# Patient Record
Sex: FEMALE | Race: OTHER | HISPANIC OR LATINO | Employment: OTHER | URBAN - METROPOLITAN AREA
[De-identification: names, ages, dates, MRNs, and addresses within clinical notes are randomized per-mention and may not be internally consistent; named-entity substitution may affect disease eponyms.]

---

## 2018-07-12 PROBLEM — R73.03 PREDIABETES: Status: ACTIVE | Noted: 2017-11-02

## 2018-07-25 ENCOUNTER — APPOINTMENT (OUTPATIENT)
Dept: LAB | Facility: HOSPITAL | Age: 48
End: 2018-07-25
Payer: COMMERCIAL

## 2018-07-25 ENCOUNTER — OFFICE VISIT (OUTPATIENT)
Dept: FAMILY MEDICINE CLINIC | Facility: CLINIC | Age: 48
End: 2018-07-25
Payer: COMMERCIAL

## 2018-07-25 VITALS
WEIGHT: 185.8 LBS | DIASTOLIC BLOOD PRESSURE: 78 MMHG | HEIGHT: 65 IN | SYSTOLIC BLOOD PRESSURE: 118 MMHG | BODY MASS INDEX: 30.96 KG/M2

## 2018-07-25 DIAGNOSIS — R10.84 GENERALIZED ABDOMINAL PAIN: Primary | ICD-10-CM

## 2018-07-25 DIAGNOSIS — E66.09 CLASS 1 OBESITY DUE TO EXCESS CALORIES WITHOUT SERIOUS COMORBIDITY WITH BODY MASS INDEX (BMI) OF 30.0 TO 30.9 IN ADULT: ICD-10-CM

## 2018-07-25 DIAGNOSIS — N39.3 STRESS INCONTINENCE: ICD-10-CM

## 2018-07-25 DIAGNOSIS — R73.01 IMPAIRED FASTING BLOOD SUGAR: ICD-10-CM

## 2018-07-25 DIAGNOSIS — R10.84 GENERALIZED ABDOMINAL PAIN: ICD-10-CM

## 2018-07-25 DIAGNOSIS — Z12.4 SCREENING FOR CERVICAL CANCER: ICD-10-CM

## 2018-07-25 PROBLEM — R73.03 PREDIABETES: Status: RESOLVED | Noted: 2017-11-02 | Resolved: 2018-07-25

## 2018-07-25 LAB
ALBUMIN SERPL BCP-MCNC: 4 G/DL (ref 3.5–5)
ALP SERPL-CCNC: 74 U/L (ref 46–116)
ALT SERPL W P-5'-P-CCNC: 6 U/L (ref 12–78)
ANION GAP SERPL CALCULATED.3IONS-SCNC: 6 MMOL/L (ref 4–13)
AST SERPL W P-5'-P-CCNC: 28 U/L (ref 5–45)
BILIRUB SERPL-MCNC: 0.73 MG/DL (ref 0.2–1)
BUN SERPL-MCNC: 15 MG/DL (ref 5–25)
CALCIUM SERPL-MCNC: 9.1 MG/DL (ref 8.3–10.1)
CHLORIDE SERPL-SCNC: 104 MMOL/L (ref 100–108)
CHOLEST SERPL-MCNC: 162 MG/DL (ref 50–200)
CO2 SERPL-SCNC: 28 MMOL/L (ref 21–32)
CREAT SERPL-MCNC: 0.5 MG/DL (ref 0.6–1.3)
EST. AVERAGE GLUCOSE BLD GHB EST-MCNC: 114 MG/DL
GFR SERPL CREATININE-BSD FRML MDRD: 116 ML/MIN/1.73SQ M
GLUCOSE P FAST SERPL-MCNC: 99 MG/DL (ref 65–99)
HBA1C MFR BLD: 5.6 % (ref 4.2–6.3)
HDLC SERPL-MCNC: 58 MG/DL (ref 40–60)
LDLC SERPL CALC-MCNC: 91 MG/DL (ref 0–100)
NONHDLC SERPL-MCNC: 104 MG/DL
POTASSIUM SERPL-SCNC: 4.3 MMOL/L (ref 3.5–5.3)
PROT SERPL-MCNC: 7.8 G/DL (ref 6.4–8.2)
SODIUM SERPL-SCNC: 138 MMOL/L (ref 136–145)
TRIGL SERPL-MCNC: 67 MG/DL
TSH SERPL DL<=0.05 MIU/L-ACNC: 2.91 UIU/ML (ref 0.36–3.74)

## 2018-07-25 PROCEDURE — 3008F BODY MASS INDEX DOCD: CPT | Performed by: FAMILY MEDICINE

## 2018-07-25 PROCEDURE — 80061 LIPID PANEL: CPT

## 2018-07-25 PROCEDURE — 80053 COMPREHEN METABOLIC PANEL: CPT

## 2018-07-25 PROCEDURE — 84443 ASSAY THYROID STIM HORMONE: CPT

## 2018-07-25 PROCEDURE — 83036 HEMOGLOBIN GLYCOSYLATED A1C: CPT

## 2018-07-25 PROCEDURE — 36415 COLL VENOUS BLD VENIPUNCTURE: CPT

## 2018-07-25 PROCEDURE — 99204 OFFICE O/P NEW MOD 45 MIN: CPT | Performed by: FAMILY MEDICINE

## 2018-07-25 NOTE — PROGRESS NOTES
Assessment/Plan:    Impaired fasting blood sugar  Check sugar and also Hb A1c    Class 1 obesity due to excess calories without serious comorbidity with body mass index (BMI) of 30 0 to 30 9 in adult  Discussed diet and exercise    Generalized abdominal pain  Refer to general surgery Rule out ventral hernia     Stress incontinence  Continue kegel exercises Patient needs to see GYN       Diagnoses and all orders for this visit:    Generalized abdominal pain  -     Comprehensive metabolic panel; Future  -     Ambulatory referral to General Surgery; Future    Impaired fasting blood sugar  -     Hemoglobin A1C; Future    Class 1 obesity due to excess calories without serious comorbidity with body mass index (BMI) of 30 0 to 30 9 in adult  -     Lipid panel; Future  -     TSH baseline; Future    Stress incontinence  -     Ambulatory referral to Obstetrics / Gynecology; Future    Screening for cervical cancer  -     Ambulatory referral to Obstetrics / Gynecology; Future          Subjective:  Chief Complaint   Patient presents with    Urine Leakage      x 1 yr   Bloated      x  1yr  Patient ID: Husam Clark is a 56 y o  female      Patient is a new patient to me today Patient presents with 2 major concerns One is urinary leakage with coughing or laughing The second issue is abdominal pain and bloating Patient has had the urinary complaints for over one year She ahs not seen GYN is several years Patient had a tubal She has had 3 vaginal delieveries Patient has normal regular periods Patient notes that her incontinence is going on over one year Patient notes that full stomach makes it worse Patient is doing kegel exercises without any results Patient is not aware of any history of prolapse of her uterus Patient is noticing that she has had bloating and stomach pain for last one year Patient notes an area of the mid abdomen that protrudes with certain positions and seems to be "getting bigger" Patient notes she eats a diet of lots of fruits and vegetables and very littly red meat Patient did quit smoking 2 yrs ago and did gain weight with that Since her weight is up her stomach appears much bigger than it was in the past She has gained about 25 pounds per her old records       Abdominal Pain   This is a chronic problem  The current episode started more than 1 year ago  The onset quality is gradual  The problem occurs intermittently  The problem has been unchanged  The pain is located in the suprapubic region and periumbilical region  The pain is at a severity of 4/10  The quality of the pain is aching and cramping  The abdominal pain does not radiate  Associated symptoms include belching and flatus  Pertinent negatives include no anorexia, arthralgias, constipation, diarrhea, dysuria, fever, frequency, headaches, hematochezia, hematuria, melena, myalgias, nausea, vomiting or weight loss  Nothing aggravates the pain  She has tried nothing for the symptoms  There is no history of abdominal surgery, colon cancer, Crohn's disease, gallstones, GERD, irritable bowel syndrome, pancreatitis, PUD or ulcerative colitis  The following portions of the patient's history were reviewed and updated as appropriate: allergies, current medications, past family history, past medical history, past social history, past surgical history and problem list     Review of Systems   Constitutional: Negative for fatigue, fever, unexpected weight change and weight loss  HENT: Negative for congestion, sinus pain and trouble swallowing  Eyes: Negative for discharge and visual disturbance  Respiratory: Negative for cough, chest tightness, shortness of breath and wheezing  Cardiovascular: Negative for chest pain, palpitations and leg swelling  Gastrointestinal: Positive for abdominal distention, abdominal pain and flatus   Negative for anal bleeding, anorexia, blood in stool, constipation, diarrhea, hematochezia, melena, nausea, rectal pain and vomiting  Genitourinary: Negative for difficulty urinating, dysuria, frequency and hematuria  Incontinence   Musculoskeletal: Negative for arthralgias, gait problem, joint swelling and myalgias  Skin: Negative for rash and wound  Allergic/Immunologic: Negative for environmental allergies and food allergies  Neurological: Negative for dizziness, syncope, weakness, numbness and headaches  Hematological: Negative for adenopathy  Does not bruise/bleed easily  Psychiatric/Behavioral: Negative for confusion, decreased concentration and sleep disturbance  The patient is not nervous/anxious  Objective:      /78   Ht 5' 5" (1 651 m)   Wt 84 3 kg (185 lb 12 8 oz)   BMI 30 92 kg/m²          Physical Exam   Constitutional: She is oriented to person, place, and time  She appears well-developed and well-nourished  HENT:   Head: Normocephalic and atraumatic  Right Ear: External ear normal    Left Ear: External ear normal    Nose: Nose normal    Mouth/Throat: Oropharynx is clear and moist    Eyes: Conjunctivae and EOM are normal  Pupils are equal, round, and reactive to light  Neck: Normal range of motion  Neck supple  Cardiovascular: Normal rate, regular rhythm and normal heart sounds  Pulmonary/Chest: Effort normal and breath sounds normal    Abdominal:   Abdomen soft Patient has tendeness periumbilical and also suprapubic With strain there is protrusion mid line ?vental hernia vs diasthsis rectus normal bowel sounds No guarding no rebound   Lymphadenopathy:     She has no cervical adenopathy  Neurological: She is alert and oriented to person, place, and time  Skin: No rash noted  Psychiatric: She has a normal mood and affect   Her behavior is normal  Judgment and thought content normal

## 2018-07-25 NOTE — PATIENT INSTRUCTIONS
Urinary Incontinence   WHAT YOU NEED TO KNOW:   What is urinary incontinence? Urinary incontinence (UI) is when you lose control of your bladder  What causes UI? UI occurs because your bladder cannot store or empty urine properly  The following are the most common types of UI:  · Stress incontinence  is when you leak urine due to increased bladder pressure  This may happen when you cough, sneeze, or exercise  · Urge incontinence  is when you feel the need to urinate right away and leak urine accidentally  · Mixed incontinence  is when you have both stress and urge UI  What are the signs and symptoms of UI?   · You feel like your bladder does not empty completely when you urinate  · You urinate often and need to urinate immediately  · You leak urine when you sleep, or you wake up with the urge to urinate  · You leak urine when you cough, sneeze, exercise, or laugh  How is UI diagnosed? Your healthcare provider will ask how often you leak urine and whether you have stress or urge symptoms  Tell him which medicines you take, how often you urinate, and how much liquid you drink each day  You may need any of the following tests:  · Urine tests  may show infection or kidney function  · A pelvic exam  may be done to check for blockages  A pelvic exam will also show if your bladder, uterus, or other organs have moved out of place  · An x-ray, ultrasound, or CT  may show problems with parts of your urinary system  You may be given contrast liquid to help your organs show up better in the pictures  Tell the healthcare provider if you have ever had an allergic reaction to contrast liquid  Do not enter the MRI room with anything metal  Metal can cause serious injury  Tell the healthcare provider if you have any metal in or on your body  · A bladder scan  will show how much urine is left in your bladder after you urinate   You will be asked to urinate and then healthcare providers will use a small ultrasound machine to check the urine left in your bladder  · Cystometry  is used to check the function of your urinary system  Your healthcare provider checks the pressure in your bladder while filling it with fluid  Your bladder pressure may also be tested when your bladder is full and while you urinate  How is UI treated? · Medicines  can help strengthen your bladder control  · Electrical stimulation  is used to send a small amount of electrical energy to your pelvic floor muscles  This helps control your bladder function  Electrodes may be placed outside your body or in your rectum  For women, the electrodes may be placed in the vagina  · A bulking agent  may be injected into the wall of your urethra to make it thicker  This helps keep your urethra closed and decreases urine leakage  · Devices  such as a clamp, pessary, or tampon may help stop urine leaks  Ask your healthcare provider for more information about these and other devices  · Surgery  may be needed if other treatments do not work  Several types of surgery can help improve your bladder control  Ask your healthcare provider for more information about the surgery you may need  How can I manage my symptoms? · Do pelvic muscle exercises often  Your pelvic muscles help you stop urinating  Squeeze these muscles tight for 5 seconds, then relax for 5 seconds  Gradually work up to squeezing for 10 seconds  Do 3 sets of 15 repetitions a day, or as directed  This will help strengthen your pelvic muscles and improve bladder control  · A catheter  may be used to help empty your bladder  A catheter is a tiny, plastic tube that is put into your bladder to drain your urine  Your healthcare provider may tell you to use a catheter to prevent your bladder from getting too full and leaking urine  · Keep a UI record  Write down how often you leak urine and how much you leak   Make a note of what you were doing when you leaked urine     · Train your bladder  Go to the bathroom at set times, such as every 2 hours, even if you do not feel the urge to go  You can also try to hold your urine when you feel the urge to go  For example, hold your urine for 5 minutes when you feel the urge to go  As that becomes easier, hold your urine for 10 minutes  · Drink liquids as directed  Ask your healthcare provider how much liquid to drink each day and which liquids are best for you  You may need to limit the amount of liquid you drink to help control your urine leakage  Limit or do not have drinks that contain caffeine or alcohol  Do not drink any liquid right before you go to bed  · Prevent constipation  Eat a variety of high-fiber foods  Good examples are high-fiber cereals, beans, vegetables, and whole-grain breads  Prune juice may help make your bowel movement softer  Walking is the best way to trigger your intestines to have a bowel movement  · Exercise regularly and maintain a healthy weight  Ask your healthcare provider how much you should weigh and about the best exercise plan for you  Weight loss and exercise will decrease pressure on your bladder and help you control your leakage  Ask him to help you create a weight loss plan if you are overweight  When should I seek immediate care? · You have severe pain  · You are confused or cannot think clearly  When should I contact my healthcare provider? · You have a fever  · You see blood in your urine  · You have pain when you urinate  · You have new or worse pain, even after treatment  · Your mouth feels dry or you have vision changes  · Your urine is cloudy or smells bad  · You have questions or concerns about your condition or care  CARE AGREEMENT:   You have the right to help plan your care  Learn about your health condition and how it may be treated  Discuss treatment options with your caregivers to decide what care you want to receive   You always have the right to refuse treatment  The above information is an  only  It is not intended as medical advice for individual conditions or treatments  Talk to your doctor, nurse or pharmacist before following any medical regimen to see if it is safe and effective for you  © 2017 [employer] Information is for End User's use only and may not be sold, redistributed or otherwise used for commercial purposes  All illustrations and images included in CareNotes® are the copyrighted property of [employer]  or [employer]  Basic Carbohydrate Counting   AMBULATORY CARE:   Carbohydrate counting  is a way to plan your meals by counting the amount of carbohydrate in foods  Carbohydrates are the sugars, starches, and fiber found in fruit, grains, vegetables, and milk products  Carbohydrates increase your blood sugar levels  Carbohydrate counting can help you eat the right amount of carbohydrate to keep your blood sugar levels under control  What you need to know about planning meals using carbohydrate counting:  · A dietitian or healthcare provider will help you develop a healthy meal plan that works best for you  You will be taught how much carbohydrate to eat or drink for each meal and snack  Your meal plan will be based on your age, weight, usual food intake, and physical activity level  If you have diabetes, it will also include your blood sugar levels and diabetes medicine  Once you know how much carbohydrate you should eat, you can decide what type of food you want to eat  · You will need to know what foods contain carbohydrate and how much they contain  Keep track of the amount of carbohydrate in meals and snacks in order to follow your meal plan  Do not avoid carbohydrates or skip meals  Your blood sugar may fall too low if you do not eat enough carbohydrate or you skip meals    Foods that contain carbohydrate:   · Breads:  Each serving of food listed below contains about 15 g of carbohydrate   ¨ 1 slice of bread (1 ounce) or 1 flour or corn tortilla (6 inch)    ¨ ½ of a hamburger bun or ¼ of a large bagel (about 1 ounce)    ¨ 1 pancake (about 4 inches across and ¼ inch thick)    · Cereals and grains:  Serving sizes of ready-to-eat cereals vary  Look at the serving size and the total carbohydrate amount listed on the food label  Each serving of food listed below contains about 15 g of carbohydrate   ¨ ¾ cup of dry, unsweetened, ready-to-eat cereal or ¼ cup of low-fat granola     ¨ ½ cup of oatmeal or other cooked cereal     ¨ ? cup of cooked rice or pasta    · Starchy vegetables and beans:  Each serving of food listed below contains about 15 g of carbohydrate   ¨ ½ cup of corn, green peas, sweet potatoes, or mashed potatoes    ¨ ¼ of a large baked potato    ¨ ½ cup of beans, lentils, and peas (garbanzo, polo, kidney, white, split, black-eyed)    · Crackers and snacks:  Each serving of food listed below contains about 15 g of carbohydrate   ¨ 3 arpit cracker squares or 8 animal crackers     ¨ 6 saltine-type crackers    ¨ 3 cups of popcorn or ¾ ounce of pretzels, potato chips, or tortilla chips    · Fruit:  Each serving of food listed below contains about 15 g of carbohydrate   ¨ 1 small (4 ounce) piece of fresh fruit or ¾ to 1 cup of fresh fruit    ¨ ½ cup of canned or frozen fruit, packed in natural juice    ¨ ½ cup (4 ounces) of unsweetened fruit juice    ¨ 2 tablespoons of dried fruit    · Desserts or sugary foods:  Each serving of food listed below contains about 15 g of carbohydrate   ¨ 2-inch square unfrosted cake or brownie     ¨ 2 small cookies    ¨ ½ cup of ice cream, frozen yogurt, or nondairy frozen yogurt    ¨ ¼ cup of sherbet or sorbet    ¨ 1 tablespoon of regular syrup, jam, or jelly    ¨ 2 tablespoons of light syrup    · Milk and yogurt:  Foods from the milk group contain about 12 g of carbohydrate per serving      ¨ 1 cup of fat-free or low-fat milk    ¨ 1 cup of soy milk    ¨ ? cup of fat-free, yogurt sweetened with artificial sweetener    · Non-starchy vegetables:  Each serving contains about 5 g of carbohydrate   Three servings of non-starch vegetables count as 1 carbohydrate serving  ¨ ½ cup of cooked vegetables or 1 cup of raw vegetables  This includes beets, broccoli, cabbage, cauliflower, cucumber, mushrooms, tomatoes, and zucchini    ¨ ½ cup of vegetable juice  How to use carbohydrate counting to plan meals:   · Count carbohydrate amounts using serving sizes:      ¨ Pasta dinner example: You plan to have pasta, tossed salad, and an 8-ounce glass of milk  Your healthcare provider tells you that you may have 4 carbohydrate servings for dinner  One carbohydrate serving of pasta is ? cup  One cup of pasta will equal 3 carbohydrate servings  An 8-ounce glass of milk will count as 1 carbohydrate serving  These amounts of food would equal 4 carbohydrate servings  One cup of tossed salad does not count toward your carbohydrate servings  · Count carbohydrate amounts using food labels:  Find the total amount of carbohydrate in a packaged food by reading the food label  Food labels tell you the serving size of the food and the total carbohydrate amount in each serving  Find the serving size on the food label and then decide how many servings you will eat  Multiply the number of servings you plan to eat by the carbohydrate amount per serving  ¨ Granola bar snack example: Your meal plan allows you to have 2 carbohydrate servings (30 grams) of carbohydrate for a snack  You plan to eat 1 package of granola bars, which contains 2 bars  According to the food label, the serving size of food in this package is 1 bar  Each serving (1 bar) contains 25 grams of carbohydrate  The total amount of carbohydrate in this package of granola bars would be 50 g  Based on your meal plan, you should eat only 1 bar      Follow up with your healthcare provider as directed:  Write down your questions so you remember to ask them during your visits  © 2017 [employer] Information is for End User's use only and may not be sold, redistributed or otherwise used for commercial purposes  All illustrations and images included in CareNotes® are the copyrighted property of [employer]  or [employer]  The above information is an  only  It is not intended as medical advice for individual conditions or treatments  Talk to your doctor, nurse or pharmacist before following any medical regimen to see if it is safe and effective for you

## 2018-08-07 ENCOUNTER — OFFICE VISIT (OUTPATIENT)
Dept: SURGERY | Facility: CLINIC | Age: 48
End: 2018-08-07
Payer: COMMERCIAL

## 2018-08-07 DIAGNOSIS — R10.84 GENERALIZED ABDOMINAL PAIN: ICD-10-CM

## 2018-08-07 DIAGNOSIS — K43.9 VENTRAL HERNIA WITHOUT OBSTRUCTION OR GANGRENE: Primary | ICD-10-CM

## 2018-08-07 DIAGNOSIS — M62.08 RECTUS DIASTASIS: ICD-10-CM

## 2018-08-07 PROCEDURE — 99243 OFF/OP CNSLTJ NEW/EST LOW 30: CPT | Performed by: PHYSICIAN ASSISTANT

## 2018-08-07 NOTE — PROGRESS NOTES
Assessment/Plan:   Blanca Vásquez is a 56 y  o female who is here for The primary encounter diagnosis was Ventral hernia without obstruction or gangrene  Diagnoses of Generalized abdominal pain and Rectus diastasis were also pertinent to this visit  Patient with abdominal bulge and pain , patient concerned about hernia  Plan: Appearance more of a rectus diastasis , unable to palpate a hernia  Will  Get Ct scan to rule out defect  If no hernia noted referral to plastic surgery  Imaging:pending      _____________________________________________      HPI:  Blanca Vásquez is a 56 y  o female who was referred for evaluation of abdominal pain and bulge  Currently complaining of  Concern for Ventral Hernia and abdominal pain  worse with lifting, standing, walking,     no nausea and no vomiting,     regular bowel movement  Patient with periumbilical bulge and weakness since pregnancy   Patient had laparoscopic tubal ligation    Duration of pain or symptoms:  Intermittent and over 1 year      Prior abdominal surgery:   Yes      No results found for: WBC, HGB, HCT, MCV, PLT  Lab Results   Component Value Date     07/25/2018    K 4 3 07/25/2018     07/25/2018    CO2 28 07/25/2018    ANIONGAP 6 07/25/2018    BUN 15 07/25/2018    CREATININE 0 50 (L) 07/25/2018    GLUF 99 07/25/2018    CALCIUM 9 1 07/25/2018    AST 28 07/25/2018    ALT 6 (L) 07/25/2018    ALKPHOS 74 07/25/2018    PROT 7 8 07/25/2018    BILITOT 0 73 07/25/2018    EGFR 116 07/25/2018     No results found for: INR, PROTIME        ROS:  General ROS: negative  negative for - chills, fatigue, fever or night sweats, weight loss  Respiratory ROS: no cough, shortness of breath, or wheezing  Cardiovascular ROS: no chest pain or dyspnea on exertion  Genito-Urinary ROS: no dysuria, trouble voiding, or hematuria  Musculoskeletal ROS: negative for - gait disturbance, joint pain or muscle pain  Neurological ROS: no TIA or stroke symptoms  Abdominal ROS: see HPI  GI ROS: see HPI  Skin ROS: no new rashes or lesions   Lymphatic ROS: no new adenopathy noted by pt  GYN ROS: see HPI, no new GYN history or bleeding noted  Psy ROS: no new mental or behavioral disturbances       Patient Active Problem List   Diagnosis    Class 1 obesity due to excess calories without serious comorbidity with body mass index (BMI) of 30 0 to 30 9 in adult    Dense breasts    Impaired fasting blood sugar    Stress incontinence    Vitamin D deficiency    Generalized abdominal pain         Allergies: Sulfa antibiotics    Meds:No current outpatient prescriptions on file  PMH:  Past Medical History:   Diagnosis Date    Arthritis     Obesity        PSH:  Past Surgical History:   Procedure Laterality Date    TUBAL LIGATION         Family History   Problem Relation Age of Onset    No Known Problems Mother     No Known Problems Father         reports that she has quit smoking  She has never used smokeless tobacco  She reports that she does not drink alcohol or use drugs  PHYSICAL EXAM  General Appearance:    Alert, cooperative, no distress   Head:    Normocephalic without obvious abnormality   Eyes:    PERRL, conjunctiva/corneas clear, EOM's intact        Neck:   Supple, no adenopathy, no JVD   Back:     Symmetric, no spinal or CVA tenderness   Lungs:     Clear to auscultation bilaterally, no wheezing or rhonchi   Heart:    Regular rate and rhythm, S1 and S2 normal, no murmur   Abdomen:      abdomen is soft without significant tenderness, masses, organomegaly or guarding Bowel sounds are normal     tender and possible supraumbilical hernia but fascial edges not able to be palpated  Rectus diastasis  The fascial edges are not palpable due to  Central obesity  Extremities:   Extremities normal  No clubbing, cyanosis or edema   Psych:   Normal Affect, AOx3  Neurologic:  Skin:   CNII-XII intact   Strength symmetric, speech intact    Warm, dry, intact, no visible rashes or lesions                   Informed consent for procedure was personally discussed, reviewed, and signed by Dr Husam Stanton  Discussion by Dr Husam Stanton was carried out regarding risks, benefits, and alternatives with the patient  Risks include but are not limited to:  bleeding, infection, and delayed wound healing or an open wound, pulmonary embolus, leaks from bowel or bile ducts or other viscus, transfusions, death  Discussed in further detail the more common complications and their rates of occurrence   was used if necessary  Patient expressed understanding of the issues discussed and wished/consented to proceed  All questions were answered by Dr Husam Stanton  Discussion performed between patient and the provider signing below  Signature:   Abdirahmanryland Mavis    Date: 8/7/2018 Time: 12:16 PM       Some portions of this record may have been generated with voice recognition software  There may be translation, syntax,  or grammatical errors  Occasional wrong word or "sound-a-like" substitutions may have occurred due to the inherent limitations of the voice recognition software  Read the chart carefully and recognize, using context, where substitutions may have occurred  If you have any questions, please contact the dictating provider for clarification or correction, as needed  This encounter has been coded by a non-certified coder

## 2018-08-07 NOTE — LETTER
August 7, 2018     Belkis Curry DO  [address]    Patient: Di Oconnor   YOB: 1970   Date of Visit: 8/7/2018       Dear Dr Cal Green:    Thank you for referring Di Oconnor to me for evaluation  Below are my notes for this consultation  If you have questions, please do not hesitate to call me  I look forward to following your patient along with you  Sincerely,        Ayush Rodriguez PA-C        CC: No Recipients  Verner Saras  8/7/2018 12:26 PM  Sign at close encounter  Assessment/Plan:   Di Oconnor is a 56 y  o female who is here for The primary encounter diagnosis was Ventral hernia without obstruction or gangrene  Diagnoses of Generalized abdominal pain and Rectus diastasis were also pertinent to this visit  Patient with abdominal bulge and pain , patient concerned about hernia  Plan: Appearance more of a rectus diastasis , unable to palpate a hernia  Will  Get Ct scan to rule out defect  If no hernia noted referral to plastic surgery  Imaging:pending      _____________________________________________      HPI:  Di Oconnor is a 56 y  o female who was referred for evaluation of abdominal pain and bulge  Currently complaining of  Concern for Ventral Hernia and abdominal pain  worse with lifting, standing, walking,     no nausea and no vomiting,     regular bowel movement  Patient with periumbilical bulge and weakness since pregnancy   Patient had laparoscopic tubal ligation    Duration of pain or symptoms:  Intermittent and over 1 year      Prior abdominal surgery:   Yes      No results found for: WBC, HGB, HCT, MCV, PLT  Lab Results   Component Value Date     07/25/2018    K 4 3 07/25/2018     07/25/2018    CO2 28 07/25/2018    ANIONGAP 6 07/25/2018    BUN 15 07/25/2018    CREATININE 0 50 (L) 07/25/2018    GLUF 99 07/25/2018    CALCIUM 9 1 07/25/2018    AST 28 07/25/2018    ALT 6 (L) 07/25/2018    ALKPHOS 74 07/25/2018    PROT 7 8 07/25/2018    BILITOT 0 73 07/25/2018    EGFR 116 07/25/2018     No results found for: INR, PROTIME        ROS:  General ROS: negative  negative for - chills, fatigue, fever or night sweats, weight loss  Respiratory ROS: no cough, shortness of breath, or wheezing  Cardiovascular ROS: no chest pain or dyspnea on exertion  Genito-Urinary ROS: no dysuria, trouble voiding, or hematuria  Musculoskeletal ROS: negative for - gait disturbance, joint pain or muscle pain  Neurological ROS: no TIA or stroke symptoms  Abdominal ROS: see HPI  GI ROS: see HPI  Skin ROS: no new rashes or lesions   Lymphatic ROS: no new adenopathy noted by pt  GYN ROS: see HPI, no new GYN history or bleeding noted  Psy ROS: no new mental or behavioral disturbances       Patient Active Problem List   Diagnosis    Class 1 obesity due to excess calories without serious comorbidity with body mass index (BMI) of 30 0 to 30 9 in adult    Dense breasts    Impaired fasting blood sugar    Stress incontinence    Vitamin D deficiency    Generalized abdominal pain         Allergies: Sulfa antibiotics    Meds:No current outpatient prescriptions on file  PMH:  Past Medical History:   Diagnosis Date    Arthritis     Obesity        PSH:  Past Surgical History:   Procedure Laterality Date    TUBAL LIGATION         Family History   Problem Relation Age of Onset    No Known Problems Mother     No Known Problems Father         reports that she has quit smoking  She has never used smokeless tobacco  She reports that she does not drink alcohol or use drugs        PHYSICAL EXAM  General Appearance:    Alert, cooperative, no distress   Head:    Normocephalic without obvious abnormality   Eyes:    PERRL, conjunctiva/corneas clear, EOM's intact        Neck:   Supple, no adenopathy, no JVD   Back:     Symmetric, no spinal or CVA tenderness   Lungs:     Clear to auscultation bilaterally, no wheezing or rhonchi Heart:    Regular rate and rhythm, S1 and S2 normal, no murmur   Abdomen:      abdomen is soft without significant tenderness, masses, organomegaly or guarding Bowel sounds are normal     tender and possible supraumbilical hernia but fascial edges not able to be palpated  Rectus diastasis  The fascial edges are not palpable due to  Central obesity  Extremities:   Extremities normal  No clubbing, cyanosis or edema   Psych:   Normal Affect, AOx3  Neurologic:  Skin:   CNII-XII intact  Strength symmetric, speech intact    Warm, dry, intact, no visible rashes or lesions                   Informed consent for procedure was personally discussed, reviewed, and signed by Dr Debora Milton  Discussion by Dr Debora Milton was carried out regarding risks, benefits, and alternatives with the patient  Risks include but are not limited to:  bleeding, infection, and delayed wound healing or an open wound, pulmonary embolus, leaks from bowel or bile ducts or other viscus, transfusions, death  Discussed in further detail the more common complications and their rates of occurrence   was used if necessary  Patient expressed understanding of the issues discussed and wished/consented to proceed  All questions were answered by Dr Debora Milton  Discussion performed between patient and the provider signing below  Signature:   Evan Pagan    Date: 8/7/2018 Time: 12:16 PM       Some portions of this record may have been generated with voice recognition software  There may be translation, syntax,  or grammatical errors  Occasional wrong word or "sound-a-like" substitutions may have occurred due to the inherent limitations of the voice recognition software  Read the chart carefully and recognize, using context, where substitutions may have occurred  If you have any questions, please contact the dictating provider for clarification or correction, as needed  This encounter has been coded by a non-certified coder

## 2018-08-08 ENCOUNTER — OFFICE VISIT (OUTPATIENT)
Dept: OBGYN CLINIC | Facility: MEDICAL CENTER | Age: 48
End: 2018-08-08
Payer: COMMERCIAL

## 2018-08-08 VITALS — BODY MASS INDEX: 30.7 KG/M2 | DIASTOLIC BLOOD PRESSURE: 82 MMHG | WEIGHT: 184.5 LBS | SYSTOLIC BLOOD PRESSURE: 110 MMHG

## 2018-08-08 DIAGNOSIS — N85.2 ENLARGED UTERUS: Primary | ICD-10-CM

## 2018-08-08 DIAGNOSIS — R32 URINARY INCONTINENCE, UNSPECIFIED TYPE: ICD-10-CM

## 2018-08-08 PROCEDURE — 99203 OFFICE O/P NEW LOW 30 MIN: CPT | Performed by: OBSTETRICS & GYNECOLOGY

## 2018-08-08 PROCEDURE — 87086 URINE CULTURE/COLONY COUNT: CPT | Performed by: OBSTETRICS & GYNECOLOGY

## 2018-08-08 NOTE — PROGRESS NOTES
Rashaun Weldon was seen today for consult  Diagnoses and all orders for this visit:    Enlarged uterus  -     US pelvis complete w transvaginal; Future    Urinary incontinence, unspecified type  -     Urine culture  -     Urinalysis with microscopic         Plan: we discussed urine culture sent to verify for infection / on exam enlarged uterus therefore US ordered, we also discussed pelvic floor therapy and Kegel exercises     Subjective   Sosa Newell is a 56 y o  female here for a problem visit  Patient is complaining of urinary incontinence for about 1 year duration   States she has to use a pad every day because of incontinence with exercise  States very rarely she will have accidents but these do occur  She has also felt very bloated  Denies vaginal bulge or problems with flatus or BM     Patient Active Problem List   Diagnosis    Class 1 obesity due to excess calories without serious comorbidity with body mass index (BMI) of 30 0 to 30 9 in adult    Dense breasts    Impaired fasting blood sugar    Stress incontinence    Vitamin D deficiency    Generalized abdominal pain       Gynecologic History  Patient's last menstrual period was 08/03/2018 (exact date)  Past Medical History:   Diagnosis Date    Arthritis     Obesity      Past Surgical History:   Procedure Laterality Date    TUBAL LIGATION       Family History   Problem Relation Age of Onset    No Known Problems Mother     No Known Problems Father      Social History     Social History    Marital status: /Civil Union     Spouse name: N/A    Number of children: N/A    Years of education: N/A     Occupational History    Not on file       Social History Main Topics    Smoking status: Former Smoker    Smokeless tobacco: Never Used    Alcohol use No    Drug use: No    Sexual activity: Yes     Partners: Male     Birth control/ protection: Female Sterilization     Other Topics Concern    Not on file     Social History Narrative    No narrative on file     Allergies   Allergen Reactions    Sulfa Antibiotics Shortness Of Breath     Throat closes    Nyquil Multi-Symptom [Pseudoeph-Doxylamine-Dm-Apap]      No current outpatient prescriptions on file  Review of Systems  Constitutional :no fever, feels well, no tiredness, no recent weight gain or loss  ENT: no ear ache, no loss of hearing, no nosebleeds or nasal discharge, no sore throat or hoarseness  Cardiovascular: no complaints of slow or fast heart beat, no chest pain, no palpitations, no leg claudication or lower extremity edema  Respiratory: no complaints of shortness of shortness of breath, no HERNANDES  Breasts:no complaints of breast pain, breast lump, or nipple discharge  Gastrointestinal: no complaints of abdominal pain, constipation, nausea, vomiting, or diarrhea or bloody stools  Genitourinary :as noted in HPI  Musculoskeletal: no complaints of arthralgia, no myalgia, no joint swelling or stiffness, no limb pain or swelling  Integumentary: no complaints of skin rash or lesion, itching or dry skin  Neurological: no complaints of headache, no confusion, no numbness or tingling, no dizziness or fainting     Objective     /82   Wt 83 7 kg (184 lb 8 oz)   LMP 08/03/2018 (Exact Date)   Breastfeeding? No   BMI 30 70 kg/m²     General:   appears stated age, cooperative, alert normal mood and affect   Heart: regular rate and rhythm, S1, S2 normal, no murmur, click, rub or gallop   Lungs: clear to auscultation bilaterally   Abdomen: soft, non-tender, without masses or organomegaly   Vulva: normal   Vagina: normal vagina, no discharge, exudate, lesion, or erythema   Urethra: normal   Cervix: Normal, no discharge  Nontender     Uterus: well supported and enlarged / appears approximetely 11 week size    Adnexa: no mass, fullness, tenderness   Lymphatic palpation of lymph nodes in neck, axilla, groin and/or other locations: no lymphadenopathy or masses noted   Skin normal skin turgor and no rashes     Psychiatric orientation to person, place, and time: normal  mood and affect: normal

## 2018-08-09 ENCOUNTER — HOSPITAL ENCOUNTER (OUTPATIENT)
Dept: ULTRASOUND IMAGING | Facility: HOSPITAL | Age: 48
Discharge: HOME | End: 2018-08-09
Attending: OBSTETRICS & GYNECOLOGY
Payer: COMMERCIAL

## 2018-08-09 DIAGNOSIS — N85.2 ENLARGED UTERUS: ICD-10-CM

## 2018-08-09 PROCEDURE — 76856 US EXAM PELVIC COMPLETE: CPT

## 2018-08-09 PROCEDURE — 76830 TRANSVAGINAL US NON-OB: CPT

## 2018-08-10 LAB — BACTERIA UR CULT: NORMAL

## 2018-08-21 ENCOUNTER — TELEPHONE (OUTPATIENT)
Dept: OBGYN CLINIC | Facility: MEDICAL CENTER | Age: 48
End: 2018-08-21

## 2018-08-21 NOTE — TELEPHONE ENCOUNTER
Attempt to contact patient regarding Ultrasound result  Left message on voicemail : 8/13/18 @ 0830      8/14/18 @ 0830      8/21/18 @ 0940    Letter sent to patients address asking her to call office asap

## 2018-09-18 ENCOUNTER — OFFICE VISIT (OUTPATIENT)
Dept: OBGYN CLINIC | Facility: MEDICAL CENTER | Age: 48
End: 2018-09-18
Payer: COMMERCIAL

## 2018-09-18 VITALS — SYSTOLIC BLOOD PRESSURE: 110 MMHG | DIASTOLIC BLOOD PRESSURE: 70 MMHG | WEIGHT: 184 LBS | BODY MASS INDEX: 30.62 KG/M2

## 2018-09-18 DIAGNOSIS — N83.201 RIGHT OVARIAN CYST: Primary | ICD-10-CM

## 2018-09-18 PROCEDURE — 99213 OFFICE O/P EST LOW 20 MIN: CPT | Performed by: OBSTETRICS & GYNECOLOGY

## 2018-09-18 NOTE — PROGRESS NOTES
Assessment Murali Gresham was seen today for follow-up  Diagnoses and all orders for this visit:    Right ovarian cyst  -     US pelvis complete w transvaginal; Future         Plan: today we reviewed US showing large simple appearing right ovarian cyst  Patient currently asymptomatic for the most part  She has a vacation planned to [facility] beginning of October and will return in mid November   States if no resolution interested in removal via surgical intervention  Order was given for repeat US however today reassured there were no US findings such as septations , solid components , nodularity that would be worrisome  She is aware in Novemebr I will be in maternity leave and Dr Jorgito Helton information was provided   We discussed possibility of torsion and signs and symptoms reviewed     Subjective   Arjun Grant is a 56 y o  female here for a follow up visit from imaging which was sent after pelvic exam suggested enlarged uterus   States she is aware imaging showing a large cyst   State she wonders of this has been the reason for her urinary incontinence  Denies pelvic pain at this time     Patient Active Problem List   Diagnosis    Class 1 obesity due to excess calories without serious comorbidity with body mass index (BMI) of 30 0 to 30 9 in adult    Dense breasts    Impaired fasting blood sugar    Stress incontinence    Vitamin D deficiency    Generalized abdominal pain       Gynecologic History  Patient's last menstrual period was 09/01/2018  Past Medical History:   Diagnosis Date    Arthritis     Obesity      Past Surgical History:   Procedure Laterality Date    TUBAL LIGATION       Family History   Problem Relation Age of Onset    No Known Problems Mother     No Known Problems Father      Social History     Social History    Marital status: /Civil Union     Spouse name: N/A    Number of children: N/A    Years of education: N/A     Occupational History    Not on file       Social History Main Topics    Smoking status: Former Smoker    Smokeless tobacco: Never Used    Alcohol use No    Drug use: No    Sexual activity: Yes     Partners: Male     Birth control/ protection: Female Sterilization     Other Topics Concern    Not on file     Social History Narrative    No narrative on file     Allergies   Allergen Reactions    Sulfa Antibiotics Shortness Of Breath     Throat closes    Nyquil Multi-Symptom [Pseudoeph-Doxylamine-Dm-Apap]      No current outpatient prescriptions on file  Review of Systems  Constitutional :no fever, feels well, no tiredness, no recent weight gain or loss  ENT: no ear ache, no loss of hearing, no nosebleeds or nasal discharge, no sore throat or hoarseness  Cardiovascular: no complaints of slow or fast heart beat, no chest pain, no palpitations, no leg claudication or lower extremity edema  Respiratory: no complaints of shortness of shortness of breath, no HERNANDES  Breasts:no complaints of breast pain, breast lump, or nipple discharge  Gastrointestinal: no complaints of abdominal pain, constipation, nausea, vomiting, or diarrhea or bloody stools  Genitourinary :as noted in HPI  Musculoskeletal: no complaints of arthralgia, no myalgia, no joint swelling or stiffness, no limb pain or swelling  Integumentary: no complaints of skin rash or lesion, itching or dry skin  Neurological: no complaints of headache, no confusion, no numbness or tingling, no dizziness or fainting     Objective     /70   Wt 83 5 kg (184 lb)   LMP 09/01/2018   Breastfeeding? No   BMI 30 62 kg/m²     General:   appears stated age, cooperative, alert normal mood and affect   Psychiatric orientation to person, place, and time: normal  mood and affect: normal     US reviewed   1  Large right ovarian cyst measuring up to 11 7 cm  According to the consensus conference statement from the Society of Radiologists in Ultrasound (Radiology:Volume 256: Number 3-September 2010   pp [phone] ) this lesion has imaging features of a   simple cyst   In this premenopausal woman, this should be further evaluated by MR and/or surgical consultation      2   Otherwise unremarkable pelvic ultrasound

## 2018-09-20 ENCOUNTER — HOSPITAL ENCOUNTER (OUTPATIENT)
Dept: ULTRASOUND IMAGING | Facility: HOSPITAL | Age: 48
Discharge: HOME | End: 2018-09-20
Attending: OBSTETRICS & GYNECOLOGY
Payer: COMMERCIAL

## 2018-09-20 DIAGNOSIS — N83.201 RIGHT OVARIAN CYST: ICD-10-CM

## 2018-09-20 PROCEDURE — 76830 TRANSVAGINAL US NON-OB: CPT

## 2018-09-20 PROCEDURE — 76856 US EXAM PELVIC COMPLETE: CPT

## 2018-09-25 ENCOUNTER — OFFICE VISIT (OUTPATIENT)
Dept: OBGYN CLINIC | Facility: MEDICAL CENTER | Age: 48
End: 2018-09-25
Payer: COMMERCIAL

## 2018-09-25 VITALS — DIASTOLIC BLOOD PRESSURE: 70 MMHG | WEIGHT: 184.4 LBS | BODY MASS INDEX: 30.69 KG/M2 | SYSTOLIC BLOOD PRESSURE: 98 MMHG

## 2018-09-25 DIAGNOSIS — N83.201 CYST OF RIGHT OVARY: Primary | ICD-10-CM

## 2018-09-25 DIAGNOSIS — R10.2 PELVIC PAIN: ICD-10-CM

## 2018-09-25 PROCEDURE — 99214 OFFICE O/P EST MOD 30 MIN: CPT | Performed by: OBSTETRICS & GYNECOLOGY

## 2018-09-25 NOTE — PROGRESS NOTES
Assessment Milo Álvarez was seen today for follow-up  Diagnoses and all orders for this visit:    Cyst of right ovary    Pelvic pain         Plan; today we reviewed US showing large right ovarian cyst/ Imaging was also reviewed showing fluid filled cyst/ we discussed surgery in the form of laparoscopy cystectomy vs oophorectomy   We discussed potential risks of surgery including but not limited to infection , blood loss, injury to surrounding organs needing further surgery   We discussed that although imaging does not suggest suspicion for malignancy final pathology still needed and if borderline or malignancy noted she would need GYN onc referral and likley further surgery   Patient verbalized understanding and desired to proceed with surgery   Will return for H&P and to meet Dr Dodie Montgomery as this will be a joined case   Subjective   Neftaly Bourgeois is a 56 y o  female here for surgical consultation  States she has had some time to process US findings showing large ovarian cyst with slight interval growth  States she believes this certainly plays a role on her urinary incontinence as well as recently has noticed that she cannot wear compression shape ware she used to because of pain in RLQ   States she is interested in surgical removal or cyst and very likely ovary   States she was waiting to get this done before her trip to [facility] but actually is willing to postpone her trip  Patient Active Problem List   Diagnosis    Class 1 obesity due to excess calories without serious comorbidity with body mass index (BMI) of 30 0 to 30 9 in adult    Dense breasts    Impaired fasting blood sugar    Stress incontinence    Vitamin D deficiency    Generalized abdominal pain       Gynecologic History  Patient's last menstrual period was 09/01/2018        Past Medical History:   Diagnosis Date    Arthritis     Obesity      Past Surgical History:   Procedure Laterality Date    TUBAL LIGATION       Family History   Problem Relation Age of Onset    No Known Problems Mother     No Known Problems Father      Social History     Social History    Marital status: /Civil Union     Spouse name: N/A    Number of children: N/A    Years of education: N/A     Occupational History    Not on file  Social History Main Topics    Smoking status: Former Smoker    Smokeless tobacco: Never Used    Alcohol use No    Drug use: No    Sexual activity: Yes     Partners: Male     Birth control/ protection: Female Sterilization     Other Topics Concern    Not on file     Social History Narrative    No narrative on file     Allergies   Allergen Reactions    Sulfa Antibiotics Shortness Of Breath     Throat closes    Nyquil Multi-Symptom [Pseudoeph-Doxylamine-Dm-Apap]      No current outpatient prescriptions on file  Review of Systems  Constitutional :no fever, feels well, no tiredness, no recent weight gain or loss  ENT: no ear ache, no loss of hearing, no nosebleeds or nasal discharge, no sore throat or hoarseness  Cardiovascular: no complaints of slow or fast heart beat, no chest pain, no palpitations, no leg claudication or lower extremity edema  Respiratory: no complaints of shortness of shortness of breath, no HERNANDES  Breasts:no complaints of breast pain, breast lump, or nipple discharge  Gastrointestinal: no complaints of abdominal pain, constipation, nausea, vomiting, or diarrhea or bloody stools  Genitourinary : as noted in HPI  Musculoskeletal: no complaints of arthralgia, no myalgia, no joint swelling or stiffness, no limb pain or swelling  Integumentary: no complaints of skin rash or lesion, itching or dry skin  Neurological: no complaints of headache, no confusion, no numbness or tingling, no dizziness or fainting     Objective     BP 98/70   Wt 83 6 kg (184 lb 6 4 oz)   LMP 09/01/2018   Breastfeeding?  No   BMI 30 69 kg/m²     General:   appears stated age, cooperative, alert normal mood and affect   Psychiatric orientation to person, place, and time: normal  mood and affect: normal     FINDINGS:     UTERUS:  The uterus is anteverted in position, measuring 2 9 x 4 0 x 9 8 cm  Contour and echotexture appear normal   The cervix shows no suspicious abnormality      ENDOMETRIUM:    Normal caliber of 14 mm  Homogenous and normal in appearance      OVARIES/ADNEXA:  Right ovary:  11 4 x 12 0 x 12 0 cm  There is a large ovarian cyst with echogenic thickening along the wall  Complexity was not definitely present previously  This measures 11 9 x 12 1 x 9 8 cm  There is no internal vascularity  This measured 10 0 x 12 2 x 8 5 cm  Doppler flow within normal limits      Left ovary:  2 4 x 3 0 x 3 1 cm  There is a 1 2 x 1 3 cm echogenic lesion in the left ovary seen on cine series 1F, image 28, not present previously  Doppler flow within normal limits      No suspicious adnexal mass or loculated collections  There is small amount of simple free fluid in the pelvis, likely physiologic in this premenopausal female      IMPRESSION:     1  Large mildly complex right ovarian cyst without internal vascularity appears slightly increased in size and complexity  As this is not physiologic, consider surgical resection      2   Echogenic lesion in the left ovary, not present previously  This is nonspecific in appearance, possibly a corpus luteum or endometrioma  Follow-up ultrasound in 6 weeks is recommended  If MRI is obtained prior to potential surgery, this can be   evaluated at that time

## 2018-11-28 ENCOUNTER — OFFICE VISIT (OUTPATIENT)
Dept: OBGYN CLINIC | Facility: MEDICAL CENTER | Age: 48
End: 2018-11-28
Payer: COMMERCIAL

## 2018-11-28 VITALS
DIASTOLIC BLOOD PRESSURE: 84 MMHG | HEIGHT: 65 IN | WEIGHT: 186.5 LBS | SYSTOLIC BLOOD PRESSURE: 116 MMHG | BODY MASS INDEX: 31.07 KG/M2

## 2018-11-28 DIAGNOSIS — N83.8 OVARIAN MASS, RIGHT: Primary | ICD-10-CM

## 2018-11-28 PROCEDURE — 99214 OFFICE O/P EST MOD 30 MIN: CPT | Performed by: OBSTETRICS & GYNECOLOGY

## 2018-11-29 ENCOUNTER — APPOINTMENT (OUTPATIENT)
Dept: LAB | Facility: HOSPITAL | Age: 48
End: 2018-11-29
Attending: OBSTETRICS & GYNECOLOGY
Payer: COMMERCIAL

## 2018-11-29 DIAGNOSIS — N83.8 OVARIAN MASS, RIGHT: ICD-10-CM

## 2018-11-29 LAB — CANCER AG125 SERPL-ACNC: 67.2 U/ML (ref 0–30)

## 2018-11-29 PROCEDURE — 86304 IMMUNOASSAY TUMOR CA 125: CPT

## 2018-11-29 PROCEDURE — 36415 COLL VENOUS BLD VENIPUNCTURE: CPT

## 2018-11-30 ENCOUNTER — TELEPHONE (OUTPATIENT)
Dept: OBGYN CLINIC | Facility: MEDICAL CENTER | Age: 48
End: 2018-11-30

## 2018-11-30 NOTE — PROGRESS NOTES
Assessment Emma Servin was seen today for h&p  Diagnoses and all orders for this visit:    Ovarian mass, right  -     ; Future  - Gyn oncologist message to evaluate imaging and provide recommendations  - Consent obtained for Dx Laparoscopy, Right oophorectomy, bilateral salpingectomy and possible contralateral oophorectomy if indicated and any other procedures  Dane Almeida is a 56 y o  female  here for pre operative H&P for Dx Laparoscopy, Right oophorectomy, bilateral salpingectomy and possible contralateral oophorectomy if indicated and any other procedures  She received counseling from Dr Palma Arana regarding the surgery and was going to have surgery done with Dr Palma Arana but she went on a trip to [facility] and now returned and she would like to know proceed with the surgery thus came to me for consultation to perform the surgery which is already scheduled for 12/10  The patient c/o pelvic pain mainly on the right side and has noticed increased abdominal girth to the point that her pants dont fit anymore, she finds herself very bloated and would like to proceed with the surgery as soon as possible  Risk and benefits of the surgery discussed with the patient  Including but not limited to risk of bleeding, infection, damage to nearby organs, DVT, PE, anesthesia complications, possible need to convert to a laparotomy and possible death  Discussed the typical recovery time after minimally invasive surgery to be 2 weekswith the understanding that it can be longer if a laparotomy is needed  Patient was allowed to ask questions and these were answered  It was explained to the patient the imaging shows the ovarian cyst to be mainly simple but most recent imaging showed some complexity and wall thickening thus I advised her to have a Ca 125 done prior to going to the OR and will consult with Gyn Oncologist for their recommendations   I explained to the patient that in the case that the cyst ruptures and it has malignancy there is a possibility of upstaging her condition  Therefore attempts will be made in order to try to remove the mass in a contained bag with the hope that if it burst it is contained in the bag and avoid spillage  Patient Active Problem List   Diagnosis    Class 1 obesity due to excess calories without serious comorbidity with body mass index (BMI) of 30 0 to 30 9 in adult    Dense breasts    Impaired fasting blood sugar    Stress incontinence    Vitamin D deficiency    Generalized abdominal pain       Gynecologic History  Patient's last menstrual period was 11/16/2018  The current method of family planning is condoms most of the time  Past Medical History:   Diagnosis Date    Arthritis     Obesity      Past Surgical History:   Procedure Laterality Date    TUBAL LIGATION       Family History   Problem Relation Age of Onset    No Known Problems Mother     No Known Problems Father      Social History     Social History    Marital status: /Civil Union     Spouse name: N/A    Number of children: N/A    Years of education: N/A     Occupational History    Not on file  Social History Main Topics    Smoking status: Former Smoker    Smokeless tobacco: Never Used    Alcohol use No    Drug use: No    Sexual activity: Yes     Partners: Male     Birth control/ protection: Female Sterilization     Other Topics Concern    Not on file     Social History Narrative    No narrative on file     Allergies   Allergen Reactions    Sulfa Antibiotics Shortness Of Breath     Throat closes    Nyquil Multi-Symptom [Pseudoeph-Doxylamine-Dm-Apap]      No current outpatient prescriptions on file  Review of Systems  Constitutional :no fever, feels well, no tiredness, no recent weight gain or loss  ENT: no ear ache, no loss of hearing, no nosebleeds or nasal discharge, no sore throat or hoarseness    Cardiovascular: no complaints of slow or fast heart beat, no chest pain, no palpitations, no leg claudication or lower extremity edema  Respiratory: no complaints of shortness of shortness of breath, no HERNANDES  Breasts:no complaints of breast pain, breast lump, or nipple discharge  Gastrointestinal: no complaints of abdominal pain, constipation, nausea, vomiting, or diarrhea or bloody stools  Genitourinary : no complaints of dysuria, incontinence, pelvic pain, no dysmenorrhea, vaginal discharge or abnormal vaginal bleeding and as noted in HPI  Musculoskeletal: no complaints of arthralgia, no myalgia, no joint swelling or stiffness, no limb pain or swelling  Integumentary: no complaints of skin rash or lesion, itching or dry skin  Neurological: no complaints of headache, no confusion, no numbness or tingling, no dizziness or fainting     Objective     /84 (BP Location: Left arm, Patient Position: Sitting, Cuff Size: Adult)   Ht 5' 5" (1 651 m)   Wt 84 6 kg (186 lb 8 oz)   LMP 11/16/2018   BMI 31 04 kg/m²     General:   appears stated age, cooperative, alert normal mood and affect   Neck: normal, supple,trachea midline, no masses   Heart: regular rate and rhythm, S1, S2 normal, no murmur, click, rub or gallop   Lungs: clear to auscultation bilaterally   Breasts: normal appearance, no masses or tenderness   Abdomen: soft, non-tender, without masses or organomegaly   Vulva: normal   Vagina: not evaluated   Urethra: normal   Adnexa: positive for: enlargement, fullness, mass, swelling and tenderness   Lymphatic palpation of lymph nodes in neck, axilla, groin and/or other locations: no lymphadenopathy or masses noted   Skin normal skin turgor and no rashes     Psychiatric orientation to person, place, and time: normal  mood and affect: normal     I have spent 25 minutes with Patient  today in which greater than 50% of this time was spent in counseling/coordination of care regarding Diagnostic results, Prognosis, Risks and benefits of tx options, Intructions for management, Patient and family education, Importance of tx compliance, Risk factor reductions and Impressions

## 2018-11-30 NOTE — H&P
Assessment Jenny Chu was seen today for h&p  Diagnoses and all orders for this visit:    Ovarian mass, right  -     ; Future  - Gyn oncologist message to evaluate imaging and provide recommendations  - Consent obtained for Dx Laparoscopy, Right oophorectomy, bilateral salpingectomy and possible contralateral oophorectomy if indicated and any other procedures  aDne Major is a 56 y o  female  here for pre operative H&P for Dx Laparoscopy, Right oophorectomy, bilateral salpingectomy and possible contralateral oophorectomy if indicated and any other procedures  She received counseling from Dr David Dietrich regarding the surgery and was going to have surgery done with Dr David Dietrich but she went on a trip to [facility] and now returned and she would like to know proceed with the surgery thus came to me for consultation to perform the surgery which is already scheduled for 12/10  The patient c/o pelvic pain mainly on the right side and has noticed increased abdominal girth to the point that her pants dont fit anymore, she finds herself very bloated and would like to proceed with the surgery as soon as possible  Risk and benefits of the surgery discussed with the patient  Including but not limited to risk of bleeding, infection, damage to nearby organs, DVT, PE, anesthesia complications, possible need to convert to a laparotomy and possible death  Discussed the typical recovery time after minimally invasive surgery to be 2 weekswith the understanding that it can be longer if a laparotomy is needed  Patient was allowed to ask questions and these were answered  It was explained to the patient the imaging shows the ovarian cyst to be mainly simple but most recent imaging showed some complexity and wall thickening thus I advised her to have a Ca 125 done prior to going to the OR and will consult with Gyn Oncologist for their recommendations   I explained to the patient that in the case that the cyst ruptures and it has malignancy there is a possibility of upstaging her condition  Therefore attempts will be made in order to try to remove the mass in a contained bag with the hope that if it burst it is contained in the bag and avoid spillage  Patient Active Problem List   Diagnosis    Class 1 obesity due to excess calories without serious comorbidity with body mass index (BMI) of 30 0 to 30 9 in adult    Dense breasts    Impaired fasting blood sugar    Stress incontinence    Vitamin D deficiency    Generalized abdominal pain       Gynecologic History  Patient's last menstrual period was 11/16/2018  The current method of family planning is condoms most of the time  Past Medical History:   Diagnosis Date    Arthritis     Obesity      Past Surgical History:   Procedure Laterality Date    TUBAL LIGATION       Family History   Problem Relation Age of Onset    No Known Problems Mother     No Known Problems Father      Social History     Social History    Marital status: /Civil Union     Spouse name: N/A    Number of children: N/A    Years of education: N/A     Occupational History    Not on file  Social History Main Topics    Smoking status: Former Smoker    Smokeless tobacco: Never Used    Alcohol use No    Drug use: No    Sexual activity: Yes     Partners: Male     Birth control/ protection: Female Sterilization     Other Topics Concern    Not on file     Social History Narrative    No narrative on file     Allergies   Allergen Reactions    Sulfa Antibiotics Shortness Of Breath     Throat closes    Nyquil Multi-Symptom [Pseudoeph-Doxylamine-Dm-Apap]      No current outpatient prescriptions on file  Review of Systems  Constitutional :no fever, feels well, no tiredness, no recent weight gain or loss  ENT: no ear ache, no loss of hearing, no nosebleeds or nasal discharge, no sore throat or hoarseness    Cardiovascular: no complaints of slow or fast heart beat, no chest pain, no palpitations, no leg claudication or lower extremity edema  Respiratory: no complaints of shortness of shortness of breath, no HERNANDES  Breasts:no complaints of breast pain, breast lump, or nipple discharge  Gastrointestinal: no complaints of abdominal pain, constipation, nausea, vomiting, or diarrhea or bloody stools  Genitourinary : no complaints of dysuria, incontinence, pelvic pain, no dysmenorrhea, vaginal discharge or abnormal vaginal bleeding and as noted in HPI  Musculoskeletal: no complaints of arthralgia, no myalgia, no joint swelling or stiffness, no limb pain or swelling  Integumentary: no complaints of skin rash or lesion, itching or dry skin  Neurological: no complaints of headache, no confusion, no numbness or tingling, no dizziness or fainting     Objective     /84 (BP Location: Left arm, Patient Position: Sitting, Cuff Size: Adult)   Ht 5' 5" (1 651 m)   Wt 84 6 kg (186 lb 8 oz)   LMP 11/16/2018   BMI 31 04 kg/m²      General:   appears stated age, cooperative, alert normal mood and affect   Neck: normal, supple,trachea midline, no masses   Heart: regular rate and rhythm, S1, S2 normal, no murmur, click, rub or gallop   Lungs: clear to auscultation bilaterally   Breasts: normal appearance, no masses or tenderness   Abdomen: soft, non-tender, without masses or organomegaly   Vulva: normal   Vagina: not evaluated   Urethra: normal   Adnexa: positive for: enlargement, fullness, mass, swelling and tenderness   Lymphatic palpation of lymph nodes in neck, axilla, groin and/or other locations: no lymphadenopathy or masses noted   Skin normal skin turgor and no rashes     Psychiatric orientation to person, place, and time: normal  mood and affect: normal

## 2018-11-30 NOTE — TELEPHONE ENCOUNTER
----- Message from Fabby Arteaga sent at 11/30/2018 11:16 AM EST -----  Regarding: RE:  I called and spoke with Yumiko at gyn/onc who is the OR   Dr Jason Figueroa and Dr Davion Amezquita both have a case that day, but states she will have Dr Sofia Robertson or Dr Martha Guadarrama on stand by as well for you    ----- Message -----  From: Ricky Mercedes  Sent: 11/30/2018   6:47 AM  To: Fabby Arteaga  Subject: FW:                                              Can you please call their office and let them know this surgery day for this patient so that they can be on stand by Garland Li needs their assistance with this case  Thank you      ----- Message -----  From: Michael Celeste MD  Sent: 11/30/2018   5:44 AM  To: Kayla Yi MD  Subject: RE:                                              Yes    call our office and have them help you coordinate to have one of us as stand by  IZ  ----- Message -----  From: Kayla Yi MD  Sent: 11/29/2018   9:52 PM  To: MD Avis Rangeleduin Bobby thank you  Will order the pelvic US before taking her to the OR  I received the Ca125 and it is 79 do you think that is high enough to be concerned? Is it possible for one the Gyn Oncologist from you office could be available that day to do the case with me? I am happy to accommodate the time in accordance to the schedule of whoever can come and assist me  Thanks in advance  ----- Message -----  From: Michael Celeste MD  Sent: 11/28/2018   4:37 PM  To: Kayla Yi MD    The large cystic lesion is mostly simple  The other ovary has a nodular lesion that needs to be reimaged before you do surgery to decide what to do with that ovary  As always, the key here is the counseling and your level of comfort doing the case  You can do, we can be (or not) on backup as you see fit or you can refer   up to you    Jolly Durham    ----- Message -----  From: Kayla Yi MD  Sent: 11/28/2018   2:20 PM  To: MD Micha Rangel Dr  Nan:     Can you please take a look at this patient's imaging and let me know if I should have you (gyn Oncology) involved in her scheduled surgery just in case this cyst has malignancy  I just inherited her from [place] who she wrote in her note was going to be a joined case but I am not sure if this was with me or with you  I am planning to order tumor markers as there is complexity and thickening of the wall not present before  She is already scheduled for 12/10 but wanted to know your opinion before then  So that we can plan if possible         Thanks in advance

## 2018-12-03 NOTE — PRE-PROCEDURE INSTRUCTIONS
No outpatient prescriptions have been marked as taking for the 12/10/18 encounter [facility] Encounter)  Instructed has no medications to be taken morning of surgery  No NSAIDs, aspirin, or vitamins 1 week before surgery  Instructed re chlorhexidine showers per hospital policy

## 2018-12-05 ENCOUNTER — HOSPITAL ENCOUNTER (OUTPATIENT)
Dept: ULTRASOUND IMAGING | Facility: HOSPITAL | Age: 48
Discharge: HOME | End: 2018-12-05
Attending: OBSTETRICS & GYNECOLOGY
Payer: COMMERCIAL

## 2018-12-05 DIAGNOSIS — N83.8 OVARIAN MASS, RIGHT: ICD-10-CM

## 2018-12-05 PROCEDURE — 76856 US EXAM PELVIC COMPLETE: CPT

## 2018-12-05 PROCEDURE — 76830 TRANSVAGINAL US NON-OB: CPT

## 2018-12-07 ENCOUNTER — ANESTHESIA EVENT (OUTPATIENT)
Dept: PERIOP | Facility: HOSPITAL | Age: 48
End: 2018-12-07
Payer: COMMERCIAL

## 2018-12-10 ENCOUNTER — HOSPITAL ENCOUNTER (OUTPATIENT)
Facility: HOSPITAL | Age: 48
Setting detail: OUTPATIENT SURGERY
Discharge: HOME | End: 2018-12-10
Attending: OBSTETRICS & GYNECOLOGY | Admitting: OBSTETRICS & GYNECOLOGY
Payer: COMMERCIAL

## 2018-12-10 ENCOUNTER — ANESTHESIA (OUTPATIENT)
Dept: PERIOP | Facility: HOSPITAL | Age: 48
End: 2018-12-10
Payer: COMMERCIAL

## 2018-12-10 VITALS
BODY MASS INDEX: 30.99 KG/M2 | OXYGEN SATURATION: 96 % | DIASTOLIC BLOOD PRESSURE: 65 MMHG | WEIGHT: 186 LBS | SYSTOLIC BLOOD PRESSURE: 110 MMHG | HEIGHT: 65 IN | RESPIRATION RATE: 16 BRPM | TEMPERATURE: 98.3 F | HEART RATE: 81 BPM

## 2018-12-10 DIAGNOSIS — Z90.722 S/P BILATERAL SALPINGO-OOPHORECTOMY: Primary | ICD-10-CM

## 2018-12-10 DIAGNOSIS — N83.8 OVARIAN MASS, RIGHT: ICD-10-CM

## 2018-12-10 LAB
ABO GROUP BLD: NORMAL
ALBUMIN SERPL BCP-MCNC: 3.8 G/DL (ref 3.5–5)
ALP SERPL-CCNC: 87 U/L (ref 46–116)
ALT SERPL W P-5'-P-CCNC: 27 U/L (ref 12–78)
ANION GAP SERPL CALCULATED.3IONS-SCNC: 9 MMOL/L (ref 4–13)
AST SERPL W P-5'-P-CCNC: 16 U/L (ref 5–45)
ATRIAL RATE: 73 BPM
BILIRUB SERPL-MCNC: 0.59 MG/DL (ref 0.2–1)
BLD GP AB SCN SERPL QL: NEGATIVE
BUN SERPL-MCNC: 12 MG/DL (ref 5–25)
CALCIUM SERPL-MCNC: 9.1 MG/DL (ref 8.3–10.1)
CHLORIDE SERPL-SCNC: 104 MMOL/L (ref 100–108)
CO2 SERPL-SCNC: 27 MMOL/L (ref 21–32)
CREAT SERPL-MCNC: 0.67 MG/DL (ref 0.6–1.3)
ERYTHROCYTE [DISTWIDTH] IN BLOOD BY AUTOMATED COUNT: 12.9 % (ref 11.6–15.1)
GFR SERPL CREATININE-BSD FRML MDRD: 106 ML/MIN/1.73SQ M
GLUCOSE P FAST SERPL-MCNC: 127 MG/DL (ref 65–99)
GLUCOSE SERPL-MCNC: 127 MG/DL (ref 65–140)
HCG SERPL QL: NEGATIVE
HCT VFR BLD AUTO: 36.2 % (ref 34.8–46.1)
HGB BLD-MCNC: 12 G/DL (ref 11.5–15.4)
MCH RBC QN AUTO: 32.1 PG (ref 26.8–34.3)
MCHC RBC AUTO-ENTMCNC: 33.1 G/DL (ref 31.4–37.4)
MCV RBC AUTO: 97 FL (ref 82–98)
P AXIS: 39 DEGREES
PLATELET # BLD AUTO: 227 THOUSANDS/UL (ref 149–390)
PMV BLD AUTO: 11 FL (ref 8.9–12.7)
POTASSIUM SERPL-SCNC: 3.5 MMOL/L (ref 3.5–5.3)
PR INTERVAL: 112 MS
PROT SERPL-MCNC: 6.9 G/DL (ref 6.4–8.2)
QRS AXIS: 39 DEGREES
QRSD INTERVAL: 90 MS
QT INTERVAL: 398 MS
QTC INTERVAL: 438 MS
RBC # BLD AUTO: 3.74 MILLION/UL (ref 3.81–5.12)
RH BLD: POSITIVE
SODIUM SERPL-SCNC: 140 MMOL/L (ref 136–145)
SPECIMEN EXPIRATION DATE: NORMAL
T WAVE AXIS: 26 DEGREES
VENTRICULAR RATE: 73 BPM
WBC # BLD AUTO: 5.26 THOUSAND/UL (ref 4.31–10.16)

## 2018-12-10 PROCEDURE — 88305 TISSUE EXAM BY PATHOLOGIST: CPT | Performed by: PATHOLOGY

## 2018-12-10 PROCEDURE — 80053 COMPREHEN METABOLIC PANEL: CPT | Performed by: OBSTETRICS & GYNECOLOGY

## 2018-12-10 PROCEDURE — 84703 CHORIONIC GONADOTROPIN ASSAY: CPT | Performed by: OBSTETRICS & GYNECOLOGY

## 2018-12-10 PROCEDURE — 85027 COMPLETE CBC AUTOMATED: CPT | Performed by: OBSTETRICS & GYNECOLOGY

## 2018-12-10 PROCEDURE — 93005 ELECTROCARDIOGRAM TRACING: CPT

## 2018-12-10 PROCEDURE — 88307 TISSUE EXAM BY PATHOLOGIST: CPT | Performed by: PATHOLOGY

## 2018-12-10 PROCEDURE — 88302 TISSUE EXAM BY PATHOLOGIST: CPT | Performed by: PATHOLOGY

## 2018-12-10 PROCEDURE — 88112 CYTOPATH CELL ENHANCE TECH: CPT | Performed by: PATHOLOGY

## 2018-12-10 PROCEDURE — 86850 RBC ANTIBODY SCREEN: CPT | Performed by: OBSTETRICS & GYNECOLOGY

## 2018-12-10 PROCEDURE — 93010 ELECTROCARDIOGRAM REPORT: CPT | Performed by: INTERNAL MEDICINE

## 2018-12-10 PROCEDURE — 88331 PATH CONSLTJ SURG 1 BLK 1SPC: CPT | Performed by: PATHOLOGY

## 2018-12-10 PROCEDURE — 58661 LAPAROSCOPY REMOVE ADNEXA: CPT | Performed by: OBSTETRICS & GYNECOLOGY

## 2018-12-10 PROCEDURE — 86901 BLOOD TYPING SEROLOGIC RH(D): CPT | Performed by: OBSTETRICS & GYNECOLOGY

## 2018-12-10 PROCEDURE — 86900 BLOOD TYPING SEROLOGIC ABO: CPT | Performed by: OBSTETRICS & GYNECOLOGY

## 2018-12-10 RX ORDER — FENTANYL CITRATE 50 UG/ML
INJECTION, SOLUTION INTRAMUSCULAR; INTRAVENOUS AS NEEDED
Status: DISCONTINUED | OUTPATIENT
Start: 2018-12-10 | End: 2018-12-10 | Stop reason: SURG

## 2018-12-10 RX ORDER — GLYCOPYRROLATE 0.2 MG/ML
INJECTION INTRAMUSCULAR; INTRAVENOUS AS NEEDED
Status: DISCONTINUED | OUTPATIENT
Start: 2018-12-10 | End: 2018-12-10 | Stop reason: SURG

## 2018-12-10 RX ORDER — IBUPROFEN 600 MG/1
600 TABLET ORAL EVERY 6 HOURS PRN
Status: DISCONTINUED | OUTPATIENT
Start: 2018-12-10 | End: 2018-12-10 | Stop reason: HOSPADM

## 2018-12-10 RX ORDER — ONDANSETRON 2 MG/ML
INJECTION INTRAMUSCULAR; INTRAVENOUS AS NEEDED
Status: DISCONTINUED | OUTPATIENT
Start: 2018-12-10 | End: 2018-12-10 | Stop reason: SURG

## 2018-12-10 RX ORDER — ACETAMINOPHEN 325 MG/1
975 TABLET ORAL EVERY 6 HOURS PRN
Status: DISCONTINUED | OUTPATIENT
Start: 2018-12-10 | End: 2018-12-10 | Stop reason: HOSPADM

## 2018-12-10 RX ORDER — PROMETHAZINE HYDROCHLORIDE 25 MG/ML
12.5 INJECTION, SOLUTION INTRAMUSCULAR; INTRAVENOUS EVERY 6 HOURS PRN
Status: DISCONTINUED | OUTPATIENT
Start: 2018-12-10 | End: 2018-12-10 | Stop reason: HOSPADM

## 2018-12-10 RX ORDER — SODIUM CHLORIDE 9 MG/ML
125 INJECTION, SOLUTION INTRAVENOUS CONTINUOUS
Status: DISCONTINUED | OUTPATIENT
Start: 2018-12-10 | End: 2018-12-10 | Stop reason: HOSPADM

## 2018-12-10 RX ORDER — DEXAMETHASONE SODIUM PHOSPHATE 4 MG/ML
INJECTION, SOLUTION INTRA-ARTICULAR; INTRALESIONAL; INTRAMUSCULAR; INTRAVENOUS; SOFT TISSUE AS NEEDED
Status: DISCONTINUED | OUTPATIENT
Start: 2018-12-10 | End: 2018-12-10 | Stop reason: SURG

## 2018-12-10 RX ORDER — NEOSTIGMINE METHYLSULFATE 1 MG/ML
INJECTION INTRAVENOUS AS NEEDED
Status: DISCONTINUED | OUTPATIENT
Start: 2018-12-10 | End: 2018-12-10 | Stop reason: SURG

## 2018-12-10 RX ORDER — ONDANSETRON 2 MG/ML
4 INJECTION INTRAMUSCULAR; INTRAVENOUS EVERY 6 HOURS PRN
Status: DISCONTINUED | OUTPATIENT
Start: 2018-12-10 | End: 2018-12-10 | Stop reason: HOSPADM

## 2018-12-10 RX ORDER — IBUPROFEN 600 MG/1
600 TABLET ORAL EVERY 6 HOURS PRN
Status: DISCONTINUED | OUTPATIENT
Start: 2018-12-10 | End: 2018-12-10 | Stop reason: SDUPTHER

## 2018-12-10 RX ORDER — FENTANYL CITRATE/PF 50 MCG/ML
25 SYRINGE (ML) INJECTION
Status: DISCONTINUED | OUTPATIENT
Start: 2018-12-10 | End: 2018-12-10 | Stop reason: HOSPADM

## 2018-12-10 RX ORDER — KETOROLAC TROMETHAMINE 30 MG/ML
INJECTION, SOLUTION INTRAMUSCULAR; INTRAVENOUS AS NEEDED
Status: DISCONTINUED | OUTPATIENT
Start: 2018-12-10 | End: 2018-12-10 | Stop reason: SURG

## 2018-12-10 RX ORDER — OXYCODONE HYDROCHLORIDE 5 MG/1
5 TABLET ORAL EVERY 4 HOURS PRN
Status: DISCONTINUED | OUTPATIENT
Start: 2018-12-10 | End: 2018-12-10 | Stop reason: HOSPADM

## 2018-12-10 RX ORDER — PROPOFOL 10 MG/ML
INJECTION, EMULSION INTRAVENOUS AS NEEDED
Status: DISCONTINUED | OUTPATIENT
Start: 2018-12-10 | End: 2018-12-10 | Stop reason: SURG

## 2018-12-10 RX ORDER — HYDROMORPHONE HYDROCHLORIDE 2 MG/ML
INJECTION, SOLUTION INTRAMUSCULAR; INTRAVENOUS; SUBCUTANEOUS AS NEEDED
Status: DISCONTINUED | OUTPATIENT
Start: 2018-12-10 | End: 2018-12-10 | Stop reason: SURG

## 2018-12-10 RX ORDER — MIDAZOLAM HYDROCHLORIDE 1 MG/ML
INJECTION INTRAMUSCULAR; INTRAVENOUS AS NEEDED
Status: DISCONTINUED | OUTPATIENT
Start: 2018-12-10 | End: 2018-12-10 | Stop reason: SURG

## 2018-12-10 RX ORDER — ROCURONIUM BROMIDE 10 MG/ML
INJECTION, SOLUTION INTRAVENOUS AS NEEDED
Status: DISCONTINUED | OUTPATIENT
Start: 2018-12-10 | End: 2018-12-10 | Stop reason: SURG

## 2018-12-10 RX ORDER — MAGNESIUM HYDROXIDE 1200 MG/15ML
LIQUID ORAL AS NEEDED
Status: DISCONTINUED | OUTPATIENT
Start: 2018-12-10 | End: 2018-12-10 | Stop reason: HOSPADM

## 2018-12-10 RX ORDER — BUPIVACAINE HYDROCHLORIDE 2.5 MG/ML
INJECTION, SOLUTION EPIDURAL; INFILTRATION; INTRACAUDAL AS NEEDED
Status: DISCONTINUED | OUTPATIENT
Start: 2018-12-10 | End: 2018-12-10 | Stop reason: HOSPADM

## 2018-12-10 RX ORDER — OXYCODONE HYDROCHLORIDE AND ACETAMINOPHEN 5; 325 MG/1; MG/1
1 TABLET ORAL EVERY 4 HOURS PRN
Qty: 15 TABLET | Refills: 0 | Status: SHIPPED | OUTPATIENT
Start: 2018-12-10 | End: 2018-12-20

## 2018-12-10 RX ORDER — ONDANSETRON 2 MG/ML
4 INJECTION INTRAMUSCULAR; INTRAVENOUS ONCE AS NEEDED
Status: COMPLETED | OUTPATIENT
Start: 2018-12-10 | End: 2018-12-10

## 2018-12-10 RX ADMIN — IBUPROFEN 600 MG: 600 TABLET ORAL at 15:43

## 2018-12-10 RX ADMIN — MIDAZOLAM 2 MG: 1 INJECTION INTRAMUSCULAR; INTRAVENOUS at 08:54

## 2018-12-10 RX ADMIN — PROPOFOL 170 MG: 10 INJECTION, EMULSION INTRAVENOUS at 09:03

## 2018-12-10 RX ADMIN — FENTANYL CITRATE 25 MCG: 50 INJECTION, SOLUTION INTRAMUSCULAR; INTRAVENOUS at 12:05

## 2018-12-10 RX ADMIN — PROMETHAZINE HYDROCHLORIDE 12.5 MG: 25 INJECTION INTRAMUSCULAR; INTRAVENOUS at 13:49

## 2018-12-10 RX ADMIN — ROCURONIUM BROMIDE 10 MG: 10 INJECTION INTRAVENOUS at 10:50

## 2018-12-10 RX ADMIN — FENTANYL CITRATE 50 MCG: 50 INJECTION, SOLUTION INTRAMUSCULAR; INTRAVENOUS at 09:42

## 2018-12-10 RX ADMIN — SODIUM CHLORIDE 125 ML/HR: 0.9 INJECTION, SOLUTION INTRAVENOUS at 07:59

## 2018-12-10 RX ADMIN — FENTANYL CITRATE 50 MCG: 50 INJECTION, SOLUTION INTRAMUSCULAR; INTRAVENOUS at 09:14

## 2018-12-10 RX ADMIN — ONDANSETRON 4 MG: 2 INJECTION INTRAMUSCULAR; INTRAVENOUS at 09:07

## 2018-12-10 RX ADMIN — HYDROMORPHONE HYDROCHLORIDE 0.5 MG: 2 INJECTION, SOLUTION INTRAMUSCULAR; INTRAVENOUS; SUBCUTANEOUS at 10:18

## 2018-12-10 RX ADMIN — DEXAMETHASONE SODIUM PHOSPHATE 4 MG: 4 INJECTION, SOLUTION INTRAMUSCULAR; INTRAVENOUS at 09:10

## 2018-12-10 RX ADMIN — SODIUM CHLORIDE: 0.9 INJECTION, SOLUTION INTRAVENOUS at 09:15

## 2018-12-10 RX ADMIN — FENTANYL CITRATE 25 MCG: 50 INJECTION, SOLUTION INTRAMUSCULAR; INTRAVENOUS at 12:13

## 2018-12-10 RX ADMIN — LIDOCAINE HYDROCHLORIDE 50 MG: 20 INJECTION, SOLUTION INTRAVENOUS at 09:03

## 2018-12-10 RX ADMIN — ROCURONIUM BROMIDE 40 MG: 10 INJECTION INTRAVENOUS at 09:03

## 2018-12-10 RX ADMIN — ONDANSETRON 4 MG: 2 INJECTION INTRAMUSCULAR; INTRAVENOUS at 12:23

## 2018-12-10 RX ADMIN — NEOSTIGMINE METHYLSULFATE 3 MG: 1 INJECTION INTRAVENOUS at 11:12

## 2018-12-10 RX ADMIN — GLYCOPYRROLATE 0.4 MG: 0.2 INJECTION, SOLUTION INTRAMUSCULAR; INTRAVENOUS at 11:12

## 2018-12-10 RX ADMIN — KETOROLAC TROMETHAMINE 30 MG: 30 INJECTION, SOLUTION INTRAMUSCULAR at 11:12

## 2018-12-10 RX ADMIN — SODIUM CHLORIDE 125 ML/HR: 0.9 INJECTION, SOLUTION INTRAVENOUS at 12:16

## 2018-12-10 RX ADMIN — ROCURONIUM BROMIDE 10 MG: 10 INJECTION INTRAVENOUS at 09:27

## 2018-12-10 RX ADMIN — FENTANYL CITRATE 100 MCG: 50 INJECTION, SOLUTION INTRAMUSCULAR; INTRAVENOUS at 09:03

## 2018-12-10 RX ADMIN — ROCURONIUM BROMIDE 10 MG: 10 INJECTION INTRAVENOUS at 10:04

## 2018-12-10 NOTE — INTERVAL H&P NOTE
H&P reviewed  After examining the patient I find no changes in the patients condition since the H&P had been written  I met with the patient this morning with Dr Severiano Hoffmann [facility] oncologist) for the possibility of the cyst being malignant, discussed with the patient that will do frozen sections and depending on the findings of that will proceed with any indicated procedures  Patient understands and her questions were answered     Right side marked

## 2018-12-10 NOTE — ANESTHESIA POSTPROCEDURE EVALUATION
Post-Op Assessment Note      CV Status:  Stable    Mental Status:  Alert and awake    Hydration Status:  Euvolemic    PONV Controlled:  Controlled    Airway Patency:  Patent    Post Op Vitals Reviewed: Yes          Staff: Anesthesiologist           /71 (12/10/18 1230)    Temp 98 3 °F (36 8 °C) (12/10/18 1230)    Pulse 82 (12/10/18 1230)   Resp 14 (12/10/18 1230)    SpO2 98 % (12/10/18 1230)
0

## 2018-12-10 NOTE — ANESTHESIA PREPROCEDURE EVALUATION
Review of Systems/Medical History  Patient summary reviewed  Chart reviewed      Cardiovascular  Negative cardio ROS    Pulmonary  Negative pulmonary ROS Smoker ,        GI/Hepatic  Negative GI/hepatic ROS          Negative  ROS        Endo/Other    Obesity    GYN  Negative gynecology ROS          Hematology  Negative hematology ROS      Musculoskeletal  Negative musculoskeletal ROS        Neurology  Negative neurology ROS      Psychology   Negative psychology ROS              Physical Exam    Airway    Mallampati score: II  TM Distance: >3 FB  Neck ROM: full     Dental   No notable dental hx     Cardiovascular  Comment: Negative ROS, Rhythm: regular, Rate: normal, Cardiovascular exam normal    Pulmonary  Pulmonary exam normal Breath sounds clear to auscultation,     Other Findings        Anesthesia Plan  ASA Score- 2     Anesthesia Type- general with ASA Monitors  Additional Monitors:   Airway Plan: ETT  Plan Factors-Patient not instructed to abstain from smoking on day of procedure       Induction- intravenous  Postoperative Plan- Plan for postoperative opioid use  Informed Consent- Anesthetic plan and risks discussed with patient

## 2018-12-10 NOTE — H&P (VIEW-ONLY)
Assessment Mita Mayo was seen today for h&p  Diagnoses and all orders for this visit:    Ovarian mass, right  -     ; Future  - Gyn oncologist message to evaluate imaging and provide recommendations  - Consent obtained for Dx Laparoscopy, Right oophorectomy, bilateral salpingectomy and possible contralateral oophorectomy if indicated and any other procedures  Subjective      Carri Flores is a 56 y o  female  here for pre operative H&P for Dx Laparoscopy, Right oophorectomy, bilateral salpingectomy and possible contralateral oophorectomy if indicated and any other procedures  She received counseling from Dr Gema Boykin regarding the surgery and was going to have surgery done with Dr Gema Boykin but she went on a trip to [facility] and now returned and she would like to know proceed with the surgery thus came to me for consultation to perform the surgery which is already scheduled for 12/10  The patient c/o pelvic pain mainly on the right side and has noticed increased abdominal girth to the point that her pants dont fit anymore, she finds herself very bloated and would like to proceed with the surgery as soon as possible  Risk and benefits of the surgery discussed with the patient  Including but not limited to risk of bleeding, infection, damage to nearby organs, DVT, PE, anesthesia complications, possible need to convert to a laparotomy and possible death  Discussed the typical recovery time after minimally invasive surgery to be 2 weekswith the understanding that it can be longer if a laparotomy is needed  Patient was allowed to ask questions and these were answered  It was explained to the patient the imaging shows the ovarian cyst to be mainly simple but most recent imaging showed some complexity and wall thickening thus I advised her to have a Ca 125 done prior to going to the OR and will consult with Gyn Oncologist for their recommendations   I explained to the patient that in the case that the cyst ruptures and it has malignancy there is a possibility of upstaging her condition  Therefore attempts will be made in order to try to remove the mass in a contained bag with the hope that if it burst it is contained in the bag and avoid spillage  Patient Active Problem List   Diagnosis    Class 1 obesity due to excess calories without serious comorbidity with body mass index (BMI) of 30 0 to 30 9 in adult    Dense breasts    Impaired fasting blood sugar    Stress incontinence    Vitamin D deficiency    Generalized abdominal pain       Gynecologic History  Patient's last menstrual period was 11/16/2018  The current method of family planning is condoms most of the time  Past Medical History:   Diagnosis Date    Arthritis     Obesity      Past Surgical History:   Procedure Laterality Date    TUBAL LIGATION       Family History   Problem Relation Age of Onset    No Known Problems Mother     No Known Problems Father      Social History     Social History    Marital status: /Civil Union     Spouse name: N/A    Number of children: N/A    Years of education: N/A     Occupational History    Not on file  Social History Main Topics    Smoking status: Former Smoker    Smokeless tobacco: Never Used    Alcohol use No    Drug use: No    Sexual activity: Yes     Partners: Male     Birth control/ protection: Female Sterilization     Other Topics Concern    Not on file     Social History Narrative    No narrative on file     Allergies   Allergen Reactions    Sulfa Antibiotics Shortness Of Breath     Throat closes    Nyquil Multi-Symptom [Pseudoeph-Doxylamine-Dm-Apap]      No current outpatient prescriptions on file  Review of Systems  Constitutional :no fever, feels well, no tiredness, no recent weight gain or loss  ENT: no ear ache, no loss of hearing, no nosebleeds or nasal discharge, no sore throat or hoarseness    Cardiovascular: no complaints of slow or fast heart beat, no chest pain, no palpitations, no leg claudication or lower extremity edema  Respiratory: no complaints of shortness of shortness of breath, no HERNANDES  Breasts:no complaints of breast pain, breast lump, or nipple discharge  Gastrointestinal: no complaints of abdominal pain, constipation, nausea, vomiting, or diarrhea or bloody stools  Genitourinary : no complaints of dysuria, incontinence, pelvic pain, no dysmenorrhea, vaginal discharge or abnormal vaginal bleeding and as noted in HPI  Musculoskeletal: no complaints of arthralgia, no myalgia, no joint swelling or stiffness, no limb pain or swelling  Integumentary: no complaints of skin rash or lesion, itching or dry skin  Neurological: no complaints of headache, no confusion, no numbness or tingling, no dizziness or fainting     Objective     /84 (BP Location: Left arm, Patient Position: Sitting, Cuff Size: Adult)   Ht 5' 5" (1 651 m)   Wt 84 6 kg (186 lb 8 oz)   LMP 11/16/2018   BMI 31 04 kg/m²      General:   appears stated age, cooperative, alert normal mood and affect   Neck: normal, supple,trachea midline, no masses   Heart: regular rate and rhythm, S1, S2 normal, no murmur, click, rub or gallop   Lungs: clear to auscultation bilaterally   Breasts: normal appearance, no masses or tenderness   Abdomen: soft, non-tender, without masses or organomegaly   Vulva: normal   Vagina: not evaluated   Urethra: normal   Adnexa: positive for: enlargement, fullness, mass, swelling and tenderness   Lymphatic palpation of lymph nodes in neck, axilla, groin and/or other locations: no lymphadenopathy or masses noted   Skin normal skin turgor and no rashes     Psychiatric orientation to person, place, and time: normal  mood and affect: normal

## 2018-12-10 NOTE — OP NOTE
OPERATIVE REPORT  PATIENT NAME: Isai Reyes    :  1970  MRN: [id number]  Pt Location: [facility] OR ROOM 03    SURGERY DATE: 12/10/2018    Surgeon(s) and Role:     * Chai Cates MD - Primary     * Frandy Lara DO - Assisting     * Christina Johnston MD - Assisting    Preop Diagnosis:  Ovarian mass, right [N83 9]    Post-Op Diagnosis Codes:     * Ovarian mass, right [N83 9]    Procedure(s) (LRB):  LAPAROSCOPY DIAGNOSTIC, right oopherectomy, possible left oopherectomy, bilateral salpingectomy (N/A)    Specimen(s):  ID Type Source Tests Collected by Time Destination   1 : right fallopian tube Tissue Fallopian Tube, Right TISSUE EXAM Chai Cates MD 12/10/2018 1014    2 : right ovary and cyst Tissue Ovary, Right TISSUE EXAM Chai Cates MD 12/10/2018 1018    3 : pelvic washings Washing Pelvic Washing NON-GYNECOLOGIC CYTOLOGY Chai Cates MD 12/10/2018 1024    4 : left ovarian excrescence Tissue Ovary, Left TISSUE EXAM Chai Cates MD 12/10/2018 1026    5 : left fallopian tube Tissue Fallopian Tube, Left TISSUE EXAM Chai Cates MD 12/10/2018 1030    6 : left ovarian excrescence Tissue Ovary, Left TISSUE EXAM Chai Cates MD 12/10/2018 1034    7 : left ovary and portion of left fallopian tube Tissue Ovary, Left TISSUE EXAM Chai Cates MD 12/10/2018 1048      Fluids:   200mL clear yellow urine in Chew catheter   1800mL IV NS in   900cc brown mucinous fluid from R ovarian cyst    EBL:   50mL    OPERATIVE FINDINGS:   Normal sized, anteverted uterus   Normal appearing fallopian tubes and ovaries bilaterally    Grossly enlarged right simple appearing ovarian cyst (approximately [id number]), cyst was drained for 900cc dark brown mucinous fluid intra-op   Left ovarian excrescences (abnormal in appearance)   Intraop pathology report: right borderline serous tumor; left borderline serous tumor    PROCEDURE  Brief History    All risks, benefits, and alternatives to the procedure were discussed with the patient and she had the opportunity to ask questions  Description of Procedure    Patient was taken to the operating room were a time out was performed to confirm correct patient and correct procedure  General endotracheal anesthesia (GET) was administered and the patient was positioned on the OR table in the dorsal lithotomy position  All pressure points were padded and a mckayla hugger was placed to maintain control of core body temperature  A bimanual exam was performed and the uterus was noted to be anteverted, enlarged in size, with a palpable right, midline adnexal masses of approximately 18 week gestation  The patient was prepped and draped in the usual sterile fashion with chloroprep on the abdomen and betadine prep on the vagina and perineum  Operative Technique    A Chew catheter was introduced into the bladder, which drained clear yellow urine on insertion  A weighted speculum was inserted into the vagina and used to visualize the anterior lip of the cervix, which was then grasped with a single toothed tenaculum  A cone uterine manipulator was inserted into the cervix and secured to the tenaculum  The speculum was removed from the vagina  Sterile gloves were then exchanged and attention was turned to the abdomen  A 5mm incision was made 3cm inferior to the left costal margin in the midclavicular at Westfall's point  Trocar was introduced under direct visualization  Pneumoperitoneum was then established to a maximum of 15mmHg  The laparoscopic camera was placed into the port  A 10mm incision was made 2cm cephalad from the umbilicus for introduction of a 10mm trocar  Trocar placement was followed with the laparoscopic camera  A 5mm incision was made 2cm cephalad and medial from the right ASIS for introduction of a 5mm trocar  Trocar was followed with the laparoscopic camera    The entire abdomen and pelvis was inspected and there was no evidence of injury to bowel, bladder, vasculature, or other structures  Attention was then turned to the pelvis  There was a significantly enlarged right ovarian cyst present obscuring the majority of the lower pelvis  It appeared simple in appearance but grossly enlarged, on prior imaging pre-operatively [id number]  Patient was then placed in Trendelenburg and the uterus was elevated to better visualize the pelvis  A blunt probe was placed in the [place] port to displace the cyst to the left as a [place] was passed on the right port site to visualize the right infundibulopelvic ligament  The [place] was exchanged for the Enseal device and this device was used to cauterize and ligate across the IP  The ureters were noted to run inferiorly and vermiform peristalsis was visualized  Next, the right fallopian tube was noted to be normal appearing  It was then ligated and dissected with the Enseal device, working along the mesosalpinx and ending 2cm from the right uterine cornua  It was sent to pathology separately  Attention was turned to the large right ovarian cyst which was freed from the pelvic wall  A large endocatch bag was placed into the 10mm port and the cyst was fully encapsulated within the bag  This bag was brought to the anterior abdominal wall and pneumoperitoneum was shut off  A Veress needle was used to puncture the cyst within the bag  Dark brown mucinous fluid was expressed on puncture  A 60cc syringe was used to drain the cyst in this fashion, minimizing spillage of contents from the bag  A total of 900cc was collected in this fashion  Once the cyst had been completely drained, the cyst wall was freed from the cavity and sent to pathology for frozen  Pelvic washings were collected at this point  Then the left ovary and fallopian tube were examined  There were notable abnormal excrescences on the left ovary that were removed with the Enseal device and sent to pathology for frozen  These were approximately 5-7mm in size        Pathology reported right borderline serous tumor and left borderline serous tumor  The Enseal device was used to cauterize and dissect the left infundibulopelvic ligament  The left fallopian tube was then cauterized and dissected working distally from the fimbria, along the mesosalpinx, and approximately 2cm from the left uterine cornua  A small endoseal catch was inserted into the 10mm port and used to collect the left ovary and fallopian tube which were sent to pathology for frozen as well  Following bilateral oophorectomy and salpingectomy and removal of right ovarian cyst, intraabdominal fascial closure was achieved using an 0-Vicryl Veress needle and a Andree cannula at the 10mm port  Adequate closure was confirmed laparoscopically and with digital palpation of the trocar site  Next, pneumoperitoneum was allowed to escape  Adequate hemostasis was visualized  The right ASIS and Westfall's point trocars were removed under direct visualization  The laparoscope was withdrawn from the abdomen, followed by its trocar sleeve at the umbilicus  Skin incisions were closed with running absorbable suture of 4-0 monocryl  For post-operative pain control, 10mL of 0 25% bupvicaine was injected subdermally into the incision sites  Attention was turned to the vagina  Weighted speculum was reinserted into the vagina and the uterine manipulator was withdrawn  Single toothed tenaculum was removed from the anterior lip of the cervix  Good hemostasis was confirmed at the tenaculum puncture sites  Speculum was then removed from the vagina  At the conclusion of the procedure, all needle, sponge, and instrument counts were noted to be correct x2  Patient tolerated the procedure well and was transferred to PACU in stable condition prior to discharge with follow up in 1-2 weeks  Dr Mark Baeza was present and participated in all key portions of the case      Kobi Gold DO  PGY-2 OB/GYN   12/10/2018 11:31 AM

## 2018-12-11 ENCOUNTER — TELEPHONE (OUTPATIENT)
Dept: OBGYN CLINIC | Facility: MEDICAL CENTER | Age: 48
End: 2018-12-11

## 2018-12-11 NOTE — TELEPHONE ENCOUNTER
Diag  Lap and right oophorectomy 12/10 with Dr Young Setting  States that pain is well controlled  Incisions clean and dry  Denies fever  Post-op appointment scheduled

## 2018-12-26 NOTE — PROGRESS NOTES
The good news is that still a borderline tumor  No treatment at this point is warranted  We will watch closely for recurrence with intermittent CAT scan and blood work  Overall she should do very well      Thank you

## 2018-12-27 ENCOUNTER — TELEPHONE (OUTPATIENT)
Dept: OBGYN CLINIC | Facility: MEDICAL CENTER | Age: 48
End: 2018-12-27

## 2018-12-27 NOTE — TELEPHONE ENCOUNTER
----- Message from Akosua Ballard MD sent at 12/26/2018  9:21 PM EST -----  Regarding: RE: Exercise   yes  ----- Message -----  From: Emma Tee  Sent: 12/26/2018  10:58 AM  To: Akosua Ballard MD  Subject: RE: Exercise                                     Pt has post op scheduled for 1/11  Should she wait until she sees you then ?  ----- Message -----  From: Akosua Ballard MD  Sent: 12/24/2018   2:45 PM  To: Emma Tee  Subject: RE: Exercise                                     Yes, but I need to see her for post op follow up appointment  Thanks  ----- Message -----  From: Emma Tee  Sent: 12/18/2018   3:32 PM  To: Akosua Ballard MD  Subject: Exercise                                         Pt called and wants to know if its okay for her to start exercising next week  Pt had surgery 12/10  She said she has a treadmill and bicycle

## 2018-12-27 NOTE — TELEPHONE ENCOUNTER
Lmovm notifying pt Dr Eleazar De La Paz recommends her waiting until her post op appt to start exercising  To call back with any questions

## 2019-01-08 PROBLEM — C56.3 MALIGNANT NEOPLASM OF BOTH OVARIES (HCC): Status: ACTIVE | Noted: 2019-01-08

## 2019-01-08 PROBLEM — N83.8 OVARIAN MASS, RIGHT: Status: RESOLVED | Noted: 2018-11-29 | Resolved: 2019-01-08

## 2019-01-09 ENCOUNTER — OFFICE VISIT (OUTPATIENT)
Dept: GYNECOLOGIC ONCOLOGY | Facility: CLINIC | Age: 49
End: 2019-01-09
Payer: COMMERCIAL

## 2019-01-09 ENCOUNTER — APPOINTMENT (OUTPATIENT)
Dept: LAB | Facility: HOSPITAL | Age: 49
End: 2019-01-09
Attending: OBSTETRICS & GYNECOLOGY
Payer: COMMERCIAL

## 2019-01-09 VITALS
RESPIRATION RATE: 18 BRPM | HEART RATE: 68 BPM | HEIGHT: 65 IN | TEMPERATURE: 96.6 F | SYSTOLIC BLOOD PRESSURE: 120 MMHG | BODY MASS INDEX: 30.91 KG/M2 | DIASTOLIC BLOOD PRESSURE: 82 MMHG | WEIGHT: 185.5 LBS

## 2019-01-09 DIAGNOSIS — C56.3 MALIGNANT NEOPLASM OF BOTH OVARIES (HCC): Primary | ICD-10-CM

## 2019-01-09 DIAGNOSIS — C56.3 MALIGNANT NEOPLASM OF BOTH OVARIES (HCC): ICD-10-CM

## 2019-01-09 LAB — CANCER AG125 SERPL-ACNC: 18.6 U/ML (ref 0–30)

## 2019-01-09 PROCEDURE — 36415 COLL VENOUS BLD VENIPUNCTURE: CPT

## 2019-01-09 PROCEDURE — 86304 IMMUNOASSAY TUMOR CA 125: CPT

## 2019-01-09 PROCEDURE — 99215 OFFICE O/P EST HI 40 MIN: CPT | Performed by: OBSTETRICS & GYNECOLOGY

## 2019-01-09 NOTE — LETTER
January 9, 2019     Alyse Modi MD  [facility]  [address]    Patient: Chetna Hathaway   YOB: 1970   Date of Visit: 1/9/2019       Dear Dr Morgan Montenegro:    Thank you for referring Bernadette Founds to me for evaluation  Below are my notes for this consultation  If you have questions, please do not hesitate to call me  I look forward to following your patient along with you  Sincerely,        Elva Osorio MD        CC: MD Elva Pritchard MD  1/9/2019  2:07 PM  Sign at close encounter  Assessment/Plan:    Problem List Items Addressed This Visit     Malignant neoplasm of both ovaries [facility]) - Primary     Patient is a very pleasant 80-year-old female with a recent diagnosis of 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  She is status post BSO  She did have positive washings and positive excrescences at the time of surgery  No further imaging has yet been performed  Her  was elevated at prior to surgery and this has not been repeated  I have recommended a CT scan of the chest abdomen pelvis to rule out distant metastatic disease and I have also recommended a baseline   These will be performed will see the patient back in 2 weeks for further assessment  It is likely that she would not need any further treatment at this time, with the exception of observation  I have recommended the patient start on calcium 1015 100 mg p  o  daily to combat postmenopausal osteoporosis    The patient is asymptomatic regarding menopausal symptoms and therefore estrogen is not indicated especially as there is a theoretical risk of progression of disease         Relevant Orders    CT chest abdomen pelvis w wo contrast                CHIEF COMPLAINT:   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      Problem:  Cancer Staging  Malignant neoplasm of both ovaries ([address] )  Staging form: Ovary, Fallopian Tube, Primary Peritoneal, AJCC 8th Edition  - Clinical stage from 12/10/2018: FIGO Stage I (cT1, cN0, cM0) - Signed by Jonny Shrestha MD on 1/8/2019  - Pathologic stage from 12/10/2018: FIGO Stage IC3, calculated as Stage Unknown (pT1c3, pNX, cM0) - Signed by Jonny Shrestha MD on 1/8/2019        Previous therapy:     Malignant neoplasm of both ovaries ([facility] [address] )    12/10/2018 Initial Diagnosis     Malignant neoplasm of both ovaries ([facility] [address] )         12/10/2018 Surgery     Patient underwent laparoscopic bilateral salpingo-oophorectomy by Dr Colon  The patient was noted to have intraoperatively bilateral excrescences  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  Final pathology report indicates a stage I C3 final pathology report  There were malignant cells in the washings  A full staging was not performed as this was a borderline tumor  No evidence of further intra-abdominal tumor was seen laparoscopically   was elevated to 67 preoperatively  Current plan is to perform CT scan of the chest abdomen and pelvis and   If negative we will continue to observe  Patient ID: Klaus Hoffmann is a 53 y o  female  Patient reports today for evaluation for recently diagnosed stage I C3 ovarian low malignant potential tumor  The patient was noted to have an ovarian cyst and had a  drawn which was 67  As the patient was premenopausal she was taken to the operating room by General OBG[facility]  Intraoperatively bilateral ovarian masses with excrescences were identified  There was no other disease noted  The patient underwent bilateral salpingo-oophorectomy without complication  Her final pathology reports indicate low malignant potential tumors on both ovaries  Postoperatively the patient is doing well  She has some expected incisional tenderness and pain  She has had no regular bleeding she notes normal bowel and bladder function    She is taking magnesium to help with some mild constipation  The following portions of the patient's history were reviewed and updated as appropriate: allergies, current medications, past family history, past medical history, past social history, past surgical history and problem list     Review of Systems   Constitutional: Negative  Mild incisional tenderness   HENT: Negative  Eyes: Negative  Respiratory: Negative  Cardiovascular: Negative  Gastrointestinal: Positive for constipation  Endocrine: Negative  Genitourinary: Negative  Musculoskeletal: Negative  Skin: Negative  Neurological: Negative  Hematological: Negative  Psychiatric/Behavioral: Negative  No current outpatient prescriptions on file  No current facility-administered medications for this visit  Objective:    Blood pressure 120/82, pulse 68, temperature (!) 96 6 °F (35 9 °C), resp  rate 18, height 5' 5" (1 651 m), weight 84 1 kg (185 lb 8 oz), not currently breastfeeding  Body mass index is 30 87 kg/m²  Body surface area is 1 92 meters squared  Physical Exam   Constitutional: She is oriented to person, place, and time  She appears well-developed and well-nourished  HENT:   Head: Normocephalic and atraumatic  Eyes: Pupils are equal, round, and reactive to light  EOM are normal    Neck: Normal range of motion  Neck supple  Cardiovascular: Normal rate, regular rhythm and normal heart sounds  Pulmonary/Chest: Effort normal and breath sounds normal  No respiratory distress  Abdominal: Soft  Bowel sounds are normal  She exhibits no distension and no ascites  There is no tenderness  There is no rigidity, no rebound and no guarding  Genitourinary:   Genitourinary Comments: -Normal external female genitalia, normal Bartholin's and [place]'s glands                  -Normal midline urethral meatus   No lesions notes                  -Bladder without fullness mass or tenderness                  -Vagina without lesion or discharge No significant cystocele or rectocele noted                  -Cervix normal appearing without visible lesions                  -Uterus with normal contour, mobility  No tenderness,                  -Adnexae send surgically absent                  - Anus without fissure of lesion     Musculoskeletal: Normal range of motion  Lymphadenopathy:     She has no cervical adenopathy  She has no axillary adenopathy  Right: No inguinal and no supraclavicular adenopathy present  Left: No inguinal and no supraclavicular adenopathy present  Neurological: She is alert and oriented to person, place, and time  Skin: Skin is warm and dry  Psychiatric: She has a normal mood and affect   Her behavior is normal        Lab Results   Component Value Date     67 2 (H) 11/29/2018     Lab Results   Component Value Date    K 3 5 12/10/2018     12/10/2018    CO2 27 12/10/2018    BUN 12 12/10/2018    CREATININE 0 67 12/10/2018    GLUF 127 (H) 12/10/2018    CALCIUM 9 1 12/10/2018    AST 16 12/10/2018    ALT 27 12/10/2018    ALKPHOS 87 12/10/2018    EGFR 106 12/10/2018     Lab Results   Component Value Date    WBC 5 26 12/10/2018    HGB 12 0 12/10/2018    HCT 36 2 12/10/2018    MCV 97 12/10/2018     12/10/2018     No results found for: Vincenzo Boyer

## 2019-01-09 NOTE — ASSESSMENT & PLAN NOTE
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  She is status post BSO  She did have positive washings and positive excrescences at the time of surgery  No further imaging has yet been performed  Her  was elevated at prior to surgery and this has not been repeated  I have recommended a CT scan of the chest abdomen pelvis to rule out distant metastatic disease and I have also recommended a baseline   These will be performed will see the patient back in 2 weeks for further assessment  It is likely that she would not need any further treatment at this time, with the exception of observation  I have recommended the patient start on calcium 1015 100 mg p  o  daily to combat postmenopausal osteoporosis    The patient is asymptomatic regarding menopausal symptoms and therefore estrogen is not indicated especially as there is a theoretical risk of progression of disease

## 2019-01-09 NOTE — PROGRESS NOTES
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  She is status post BSO  She did have positive washings and positive excrescences at the time of surgery  No further imaging has yet been performed  Her  was elevated at prior to surgery and this has not been repeated  I have recommended a CT scan of the chest abdomen pelvis to rule out distant metastatic disease and I have also recommended a baseline   These will be performed will see the patient back in 2 weeks for further assessment  It is likely that she would not need any further treatment at this time, with the exception of observation  I have recommended the patient start on calcium 1015 100 mg p  o  daily to combat postmenopausal osteoporosis  The patient is asymptomatic regarding menopausal symptoms and therefore estrogen is not indicated especially as there is a theoretical risk of progression of disease         Relevant Orders    CT chest abdomen pelvis w wo contrast                CHIEF COMPLAINT:   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      Problem:  Cancer Staging  Malignant neoplasm of both ovaries ([facility] [address] )  Staging form: Ovary, Fallopian Tube, Primary Peritoneal, AJCC 8th Edition  - Clinical stage from 12/10/2018: FIGO Stage I (cT1, cN0, cM0) - Signed by John Herrmann MD on 1/8/2019  - Pathologic stage from 12/10/2018: Osmar Garcia Stage IC3, calculated as Stage Unknown (pT1c3, pNX, cM0) - Signed by John Herrmann MD on 1/8/2019        Previous therapy:     Malignant neoplasm of both ovaries ([facility] [address] )    12/10/2018 Initial Diagnosis     Malignant neoplasm of both ovaries ([facility] [address] )         12/10/2018 Surgery     Patient underwent laparoscopic bilateral salpingo-oophorectomy by Dr Colon    The patient was noted to have intraoperatively bilateral excrescences  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  Final pathology report indicates a stage I C3 final pathology report  There were malignant cells in the washings  A full staging was not performed as this was a borderline tumor  No evidence of further intra-abdominal tumor was seen laparoscopically   was elevated to 67 preoperatively  Current plan is to perform CT scan of the chest abdomen and pelvis and   If negative we will continue to observe  Patient ID: Debbie Perez is a 53 y o  female  Patient reports today for evaluation for recently diagnosed stage I C3 ovarian low malignant potential tumor  The patient was noted to have an ovarian cyst and had a  drawn which was 67  As the patient was premenopausal she was taken to the operating room by General O[facility]  Intraoperatively bilateral ovarian masses with excrescences were identified  There was no other disease noted  The patient underwent bilateral salpingo-oophorectomy without complication  Her final pathology reports indicate low malignant potential tumors on both ovaries  Postoperatively the patient is doing well  She has some expected incisional tenderness and pain  She has had no regular bleeding she notes normal bowel and bladder function  She is taking magnesium to help with some mild constipation  The following portions of the patient's history were reviewed and updated as appropriate: allergies, current medications, past family history, past medical history, past social history, past surgical history and problem list     Review of Systems   Constitutional: Negative  Mild incisional tenderness   HENT: Negative  Eyes: Negative  Respiratory: Negative  Cardiovascular: Negative  Gastrointestinal: Positive for constipation  Endocrine: Negative  Genitourinary: Negative  Musculoskeletal: Negative  Skin: Negative  Neurological: Negative  Hematological: Negative  Psychiatric/Behavioral: Negative  No current outpatient prescriptions on file  No current facility-administered medications for this visit  Objective:    Blood pressure 120/82, pulse 68, temperature (!) 96 6 °F (35 9 °C), resp  rate 18, height 5' 5" (1 651 m), weight 84 1 kg (185 lb 8 oz), not currently breastfeeding  Body mass index is 30 87 kg/m²  Body surface area is 1 92 meters squared  Physical Exam   Constitutional: She is oriented to person, place, and time  She appears well-developed and well-nourished  HENT:   Head: Normocephalic and atraumatic  Eyes: Pupils are equal, round, and reactive to light  EOM are normal    Neck: Normal range of motion  Neck supple  Cardiovascular: Normal rate, regular rhythm and normal heart sounds  Pulmonary/Chest: Effort normal and breath sounds normal  No respiratory distress  Abdominal: Soft  Bowel sounds are normal  She exhibits no distension and no ascites  There is no tenderness  There is no rigidity, no rebound and no guarding  Genitourinary:   Genitourinary Comments: -Normal external female genitalia, normal Bartholin's and [place]'s glands                  -Normal midline urethral meatus  No lesions notes                  -Bladder without fullness mass or tenderness                  -Vagina without lesion or discharge No significant cystocele or rectocele noted                  -Cervix normal appearing without visible lesions                  -Uterus with normal contour, mobility  No tenderness,                  -Adnexae send surgically absent                  - Anus without fissure of lesion     Musculoskeletal: Normal range of motion  Lymphadenopathy:     She has no cervical adenopathy  She has no axillary adenopathy  Right: No inguinal and no supraclavicular adenopathy present          Left: No inguinal and no supraclavicular adenopathy present  Neurological: She is alert and oriented to person, place, and time  Skin: Skin is warm and dry  Psychiatric: She has a normal mood and affect   Her behavior is normal        Lab Results   Component Value Date     67 2 (H) 11/29/2018     Lab Results   Component Value Date    K 3 5 12/10/2018     12/10/2018    CO2 27 12/10/2018    BUN 12 12/10/2018    CREATININE 0 67 12/10/2018    GLUF 127 (H) 12/10/2018    CALCIUM 9 1 12/10/2018    AST 16 12/10/2018    ALT 27 12/10/2018    ALKPHOS 87 12/10/2018    EGFR 106 12/10/2018     Lab Results   Component Value Date    WBC 5 26 12/10/2018    HGB 12 0 12/10/2018    HCT 36 2 12/10/2018    MCV 97 12/10/2018     12/10/2018     No results found for: Leonor Herring

## 2019-01-09 NOTE — PATIENT INSTRUCTIONS
Please have CT scan and blood work done this week and we will see her again next week in the office      Please supplement diet with calcium 4031-5695 mg per day

## 2019-01-11 ENCOUNTER — OFFICE VISIT (OUTPATIENT)
Dept: OBGYN CLINIC | Facility: MEDICAL CENTER | Age: 49
End: 2019-01-11

## 2019-01-11 VITALS — BODY MASS INDEX: 30.42 KG/M2 | SYSTOLIC BLOOD PRESSURE: 118 MMHG | WEIGHT: 182.8 LBS | DIASTOLIC BLOOD PRESSURE: 80 MMHG

## 2019-01-11 DIAGNOSIS — Z98.890 POST-OPERATIVE STATE: Primary | ICD-10-CM

## 2019-01-11 PROCEDURE — 99024 POSTOP FOLLOW-UP VISIT: CPT | Performed by: OBSTETRICS & GYNECOLOGY

## 2019-01-11 RX ORDER — VITAMIN E 268 MG
400 CAPSULE ORAL 2 TIMES DAILY
COMMUNITY

## 2019-01-11 RX ORDER — MULTIVITAMIN WITH IRON
1 TABLET ORAL DAILY
COMMUNITY

## 2019-01-11 NOTE — PROGRESS NOTES
Assessment There are no diagnoses linked to this encounter  Plan  Patient is doing well  Pt to f/u in 1 year for yearly visit  Pathology from surgery reviewed with the patient and pictures reviewed as well     Subjective   Cheyenne Garcia is a 53 y o  female here for a postop visit  Patient had a Dx  Laparoscopy, BSO with large ovarian cyst removal at the same time on 12/10  Pt denies any complaints and has already followed up with the Gyn Oncologist who notified her of the results of the pathology which is a serous borderline ovarian tumor and does NOT need treatment at this point other than CT and Ca 125 testing Q6 months  The most recent Ca 125 was 25 which is improved from 79 prior to the surgery  Patient is very interested in starting to exercise as she has noticed that she has gained weight from not exercising    Denies any hot flashes or any other symptoms  Has good GI and  functions    Patient Active Problem List   Diagnosis    Class 1 obesity due to excess calories without serious comorbidity with body mass index (BMI) of 30 0 to 30 9 in adult    Dense breasts    Impaired fasting blood sugar    Stress incontinence    Vitamin D deficiency    Generalized abdominal pain    Malignant neoplasm of both ovaries ([facility] [address] )       Gynecologic History  No LMP recorded  Patient is not currently having periods (Reason: Oopherectomy)  The current method of family planning is none      Past Medical History:   Diagnosis Date    Obesity      Past Surgical History:   Procedure Laterality Date    [place] LAP,DIAGNOSTIC ABDOMEN N/A 12/10/2018    Procedure: LAPAROSCOPY DIAGNOSTIC, bilateral salpingo-oophorectomy,right ovarian cystectomy,pelvic washings;  Surgeon: Gus Vital MD;  Location: [facility];  Service: Gynecology    SALPINGOOPHORECTOMY      TUBAL LIGATION       Family History   Problem Relation Age of Onset    No Known Problems Mother     No Known Problems Father      Social History     Social History    Marital status: /Civil Union     Spouse name: N/A    Number of children: N/A    Years of education: N/A     Occupational History    Not on file  Social History Main Topics    Smoking status: Former Smoker    Smokeless tobacco: Never Used    Alcohol use No    Drug use: No    Sexual activity: Yes     Partners: Male     Birth control/ protection: Female Sterilization     Other Topics Concern    Not on file     Social History Narrative    No narrative on file     Allergies   Allergen Reactions    Sulfa Antibiotics Shortness Of Breath     Throat closes    Nyquil Multi-Symptom [Pseudoeph-Doxylamine-Dm-Apap] Throat Swelling       Current Outpatient Prescriptions:     Cholecalciferol (VITAMIN D PO), Take by mouth, Disp: , Rfl:     Magnesium 250 MG TABS, Take 1 tablet by mouth daily, Disp: , Rfl:     vitamin E, tocopherol, 400 units capsule, Take 400 Units by mouth 2 (two) times a day, Disp: , Rfl:     Review of Systems  Constitutional :no fever, feels well, no tiredness, no recent weight gain or loss  ENT: no ear ache, no loss of hearing, no nosebleeds or nasal discharge, no sore throat or hoarseness  Cardiovascular: no complaints of slow or fast heart beat, no chest pain, no palpitations, no leg claudication or lower extremity edema  Respiratory: no complaints of shortness of shortness of breath, no HERNANDES  Breasts:no complaints of breast pain, breast lump, or nipple discharge  Gastrointestinal: no complaints of abdominal pain, constipation,nausea, vomiting, or diarrhea or bloody stools  Genitourinary : no complaints of dysuria, incontinence, pelvic pain, no dysmenorrhea, vaginal discharge or abnormal vaginal bleeding and as noted in HPI    Integumentary: no complaints of skin rash or lesion, itching or dry skin  Neurological: no complaints of headache, no confusion, no numbness or tingling, no dizziness or fainting     Objective     /80 (BP Location: Left arm, Patient Position: Sitting, Cuff Size: Adult)   Wt 82 9 kg (182 lb 12 8 oz)   BMI 30 42 kg/m²     General:   appears stated age, cooperative, alert normal mood and affect   Abdomen: soft, non-tender, without masses or organomegaly   Vulva: Normal , no lesions   Vagina: normal , no lesions or dryness   Urethra: normal   Cervix: Normal, no palpable masses    Uterus: Normal , non-tender, not enlarged, no palpable masses   Adnexa: Normal, non-tender without fullness or masses

## 2019-01-15 ENCOUNTER — HOSPITAL ENCOUNTER (OUTPATIENT)
Dept: CT IMAGING | Facility: HOSPITAL | Age: 49
Discharge: HOME | End: 2019-01-15
Attending: OBSTETRICS & GYNECOLOGY
Payer: COMMERCIAL

## 2019-01-15 DIAGNOSIS — C56.3 MALIGNANT NEOPLASM OF BOTH OVARIES (HCC): ICD-10-CM

## 2019-01-15 PROCEDURE — 74177 CT ABD & PELVIS W/CONTRAST: CPT

## 2019-01-15 PROCEDURE — 71260 CT THORAX DX C+: CPT

## 2019-01-15 RX ADMIN — IOHEXOL 100 ML: 350 INJECTION, SOLUTION INTRAVENOUS at 20:24

## 2019-01-21 ENCOUNTER — HOSPITAL ENCOUNTER (EMERGENCY)
Facility: HOSPITAL | Age: 49
Discharge: HOME | End: 2019-01-22
Attending: EMERGENCY MEDICINE | Admitting: EMERGENCY MEDICINE
Payer: COMMERCIAL

## 2019-01-21 ENCOUNTER — APPOINTMENT (EMERGENCY)
Dept: RADIOLOGY | Facility: HOSPITAL | Age: 49
End: 2019-01-21
Payer: COMMERCIAL

## 2019-01-21 ENCOUNTER — TELEPHONE (OUTPATIENT)
Dept: OBGYN CLINIC | Facility: MEDICAL CENTER | Age: 49
End: 2019-01-21

## 2019-01-21 DIAGNOSIS — R50.9 FEVER: Primary | ICD-10-CM

## 2019-01-21 DIAGNOSIS — N39.0 UTI (URINARY TRACT INFECTION): ICD-10-CM

## 2019-01-21 DIAGNOSIS — R11.0 NAUSEA: ICD-10-CM

## 2019-01-21 DIAGNOSIS — B34.9 ACUTE VIRAL SYNDROME: ICD-10-CM

## 2019-01-21 DIAGNOSIS — R05.9 COUGH: ICD-10-CM

## 2019-01-21 LAB
ALBUMIN SERPL BCP-MCNC: 4.4 G/DL (ref 3.5–5)
ALP SERPL-CCNC: 99 U/L (ref 46–116)
ALT SERPL W P-5'-P-CCNC: 23 U/L (ref 12–78)
ANION GAP SERPL CALCULATED.3IONS-SCNC: 13 MMOL/L (ref 4–13)
AST SERPL W P-5'-P-CCNC: 16 U/L (ref 5–45)
BACTERIA UR QL AUTO: ABNORMAL /HPF
BASOPHILS # BLD AUTO: 0.03 THOUSANDS/ΜL (ref 0–0.1)
BASOPHILS NFR BLD AUTO: 0 % (ref 0–1)
BILIRUB SERPL-MCNC: 1.1 MG/DL (ref 0.2–1)
BILIRUB UR QL STRIP: NEGATIVE
BUN SERPL-MCNC: 10 MG/DL (ref 5–25)
CALCIUM SERPL-MCNC: 9.6 MG/DL (ref 8.3–10.1)
CHLORIDE SERPL-SCNC: 98 MMOL/L (ref 100–108)
CLARITY UR: ABNORMAL
CO2 SERPL-SCNC: 26 MMOL/L (ref 21–32)
COLOR UR: YELLOW
CREAT SERPL-MCNC: 0.83 MG/DL (ref 0.6–1.3)
EOSINOPHIL # BLD AUTO: 0.01 THOUSAND/ΜL (ref 0–0.61)
EOSINOPHIL NFR BLD AUTO: 0 % (ref 0–6)
ERYTHROCYTE [DISTWIDTH] IN BLOOD BY AUTOMATED COUNT: 12.8 % (ref 11.6–15.1)
EXT PREG TEST URINE: NEGATIVE
GFR SERPL CREATININE-BSD FRML MDRD: 84 ML/MIN/1.73SQ M
GLUCOSE SERPL-MCNC: 123 MG/DL (ref 65–140)
GLUCOSE UR STRIP-MCNC: NEGATIVE MG/DL
HCT VFR BLD AUTO: 39.8 % (ref 34.8–46.1)
HGB BLD-MCNC: 13.1 G/DL (ref 11.5–15.4)
HGB UR QL STRIP.AUTO: NEGATIVE
IMM GRANULOCYTES # BLD AUTO: 0.06 THOUSAND/UL (ref 0–0.2)
IMM GRANULOCYTES NFR BLD AUTO: 1 % (ref 0–2)
KETONES UR STRIP-MCNC: NEGATIVE MG/DL
LEUKOCYTE ESTERASE UR QL STRIP: ABNORMAL
LYMPHOCYTES # BLD AUTO: 1.1 THOUSANDS/ΜL (ref 0.6–4.47)
LYMPHOCYTES NFR BLD AUTO: 9 % (ref 14–44)
MCH RBC QN AUTO: 31.9 PG (ref 26.8–34.3)
MCHC RBC AUTO-ENTMCNC: 32.9 G/DL (ref 31.4–37.4)
MCV RBC AUTO: 97 FL (ref 82–98)
MONOCYTES # BLD AUTO: 0.79 THOUSAND/ΜL (ref 0.17–1.22)
MONOCYTES NFR BLD AUTO: 7 % (ref 4–12)
NEUTROPHILS # BLD AUTO: 9.71 THOUSANDS/ΜL (ref 1.85–7.62)
NEUTS SEG NFR BLD AUTO: 83 % (ref 43–75)
NITRITE UR QL STRIP: NEGATIVE
NON-SQ EPI CELLS URNS QL MICRO: ABNORMAL /HPF
NRBC BLD AUTO-RTO: 0 /100 WBCS
PH UR STRIP.AUTO: >=9 [PH] (ref 4.5–8)
PLATELET # BLD AUTO: 232 THOUSANDS/UL (ref 149–390)
PMV BLD AUTO: 11.3 FL (ref 8.9–12.7)
POTASSIUM SERPL-SCNC: 3.8 MMOL/L (ref 3.5–5.3)
PROT SERPL-MCNC: 8.1 G/DL (ref 6.4–8.2)
PROT UR STRIP-MCNC: ABNORMAL MG/DL
RBC # BLD AUTO: 4.11 MILLION/UL (ref 3.81–5.12)
RBC #/AREA URNS AUTO: ABNORMAL /HPF
SODIUM SERPL-SCNC: 137 MMOL/L (ref 136–145)
SP GR UR STRIP.AUTO: 1.01 (ref 1–1.03)
UROBILINOGEN UR QL STRIP.AUTO: 0.2 E.U./DL
WBC # BLD AUTO: 11.7 THOUSAND/UL (ref 4.31–10.16)
WBC #/AREA URNS AUTO: ABNORMAL /HPF

## 2019-01-21 PROCEDURE — 93005 ELECTROCARDIOGRAM TRACING: CPT

## 2019-01-21 PROCEDURE — 71046 X-RAY EXAM CHEST 2 VIEWS: CPT

## 2019-01-21 PROCEDURE — 87086 URINE CULTURE/COLONY COUNT: CPT

## 2019-01-21 PROCEDURE — 96361 HYDRATE IV INFUSION ADD-ON: CPT

## 2019-01-21 PROCEDURE — 36415 COLL VENOUS BLD VENIPUNCTURE: CPT | Performed by: EMERGENCY MEDICINE

## 2019-01-21 PROCEDURE — 81025 URINE PREGNANCY TEST: CPT | Performed by: EMERGENCY MEDICINE

## 2019-01-21 PROCEDURE — 85025 COMPLETE CBC W/AUTO DIFF WBC: CPT | Performed by: EMERGENCY MEDICINE

## 2019-01-21 PROCEDURE — 96374 THER/PROPH/DIAG INJ IV PUSH: CPT

## 2019-01-21 PROCEDURE — 96375 TX/PRO/DX INJ NEW DRUG ADDON: CPT

## 2019-01-21 PROCEDURE — 80053 COMPREHEN METABOLIC PANEL: CPT | Performed by: EMERGENCY MEDICINE

## 2019-01-21 PROCEDURE — 87186 SC STD MICRODIL/AGAR DIL: CPT

## 2019-01-21 PROCEDURE — 81001 URINALYSIS AUTO W/SCOPE: CPT

## 2019-01-21 PROCEDURE — 99285 EMERGENCY DEPT VISIT HI MDM: CPT

## 2019-01-21 PROCEDURE — 87077 CULTURE AEROBIC IDENTIFY: CPT

## 2019-01-21 RX ORDER — ACETAMINOPHEN 325 MG/1
650 TABLET ORAL ONCE
Status: COMPLETED | OUTPATIENT
Start: 2019-01-21 | End: 2019-01-21

## 2019-01-21 RX ORDER — IBUPROFEN 400 MG/1
400 TABLET ORAL ONCE
Status: COMPLETED | OUTPATIENT
Start: 2019-01-21 | End: 2019-01-21

## 2019-01-21 RX ORDER — DEXAMETHASONE SODIUM PHOSPHATE 4 MG/ML
10 INJECTION, SOLUTION INTRA-ARTICULAR; INTRALESIONAL; INTRAMUSCULAR; INTRAVENOUS; SOFT TISSUE ONCE
Status: COMPLETED | OUTPATIENT
Start: 2019-01-21 | End: 2019-01-21

## 2019-01-21 RX ORDER — KETOROLAC TROMETHAMINE 30 MG/ML
15 INJECTION, SOLUTION INTRAMUSCULAR; INTRAVENOUS ONCE
Status: COMPLETED | OUTPATIENT
Start: 2019-01-21 | End: 2019-01-21

## 2019-01-21 RX ADMIN — IBUPROFEN 400 MG: 400 TABLET ORAL at 19:30

## 2019-01-21 RX ADMIN — ACETAMINOPHEN 650 MG: 325 TABLET, FILM COATED ORAL at 22:42

## 2019-01-21 RX ADMIN — KETOROLAC TROMETHAMINE 15 MG: 30 INJECTION, SOLUTION INTRAMUSCULAR at 22:52

## 2019-01-21 RX ADMIN — SODIUM CHLORIDE 1000 ML: 0.9 INJECTION, SOLUTION INTRAVENOUS at 22:46

## 2019-01-21 RX ADMIN — DEXAMETHASONE SODIUM PHOSPHATE 10 MG: 4 INJECTION, SOLUTION INTRAMUSCULAR; INTRAVENOUS at 22:54

## 2019-01-21 NOTE — TELEPHONE ENCOUNTER
calling back  States she does not have PCP    Advised to go to [facility] to r/o influenza

## 2019-01-21 NOTE — TELEPHONE ENCOUNTER
calling stating that travis has fever, chills and headache  Questions if it is r/t recent surgery    Advised to contact PCP

## 2019-01-22 VITALS
BODY MASS INDEX: 30.15 KG/M2 | DIASTOLIC BLOOD PRESSURE: 57 MMHG | OXYGEN SATURATION: 96 % | SYSTOLIC BLOOD PRESSURE: 122 MMHG | TEMPERATURE: 99 F | WEIGHT: 181.2 LBS | HEART RATE: 82 BPM | RESPIRATION RATE: 14 BRPM

## 2019-01-22 LAB
ATRIAL RATE: 92 BPM
P AXIS: 68 DEGREES
PR INTERVAL: 112 MS
QRS AXIS: 61 DEGREES
QRSD INTERVAL: 74 MS
QT INTERVAL: 324 MS
QTC INTERVAL: 400 MS
T WAVE AXIS: 34 DEGREES
VENTRICULAR RATE: 92 BPM

## 2019-01-22 PROCEDURE — 93010 ELECTROCARDIOGRAM REPORT: CPT | Performed by: INTERNAL MEDICINE

## 2019-01-22 RX ORDER — LEVOFLOXACIN 500 MG/1
500 TABLET, FILM COATED ORAL DAILY
Qty: 5 TABLET | Refills: 0 | Status: SHIPPED | OUTPATIENT
Start: 2019-01-22 | End: 2019-01-27

## 2019-01-22 RX ORDER — BENZONATATE 100 MG/1
100 CAPSULE ORAL EVERY 8 HOURS PRN
Qty: 15 CAPSULE | Refills: 0 | Status: SHIPPED | OUTPATIENT
Start: 2019-01-22 | End: 2019-01-23

## 2019-01-22 RX ORDER — ONDANSETRON 4 MG/1
4 TABLET, ORALLY DISINTEGRATING ORAL EVERY 8 HOURS PRN
Qty: 20 TABLET | Refills: 0 | Status: SHIPPED | OUTPATIENT
Start: 2019-01-22 | End: 2020-02-19 | Stop reason: ALTCHOICE

## 2019-01-22 RX ADMIN — LEVOFLOXACIN 750 MG: 500 TABLET, FILM COATED ORAL at 00:08

## 2019-01-22 NOTE — PROGRESS NOTES
Assessment/Plan:    Problem List Items Addressed This Visit     Malignant neoplasm of both ovaries 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      Problem:  Cancer Staging  Malignant neoplasm of both ovaries ([facility] [address] )  Staging form: Ovary, Fallopian Tube, Primary Peritoneal, AJCC 8th Edition  - Clinical stage from 12/10/2018: FIGO Stage I (cT1, cN0, cM0) - Signed by Mamadou Elizabeth MD on 1/8/2019  - Pathologic stage from 12/10/2018: FIGO Stage IC3, calculated as Stage Unknown (pT1c3, pNX, cM0) - Signed by Mamadou Elizabeth MD on 1/8/2019        Previous therapy:     Malignant neoplasm of both ovaries ([facility] [address] )    12/10/2018 Initial Diagnosis     Malignant neoplasm of both ovaries ([facility] [address] )         12/10/2018 Surgery     Patient underwent laparoscopic bilateral salpingo-oophorectomy by Dr Colon  The patient was noted to have intraoperatively bilateral excrescences  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  Final pathology report indicates a stage I C3 final pathology report  There were malignant cells in the washings  A full staging was not performed as this was a borderline tumor  No evidence of further intra-abdominal tumor was seen laparoscopically   was elevated to 67 preoperatively  Current plan is to perform CT scan of the chest abdomen and pelvis and    If negative we will continue to observe  Patient ID: Luci Crowell is a 53 y o  female  Patient presents today in follow-up for staging of stage I C3 incompletely staged borderline ovarian tumor  She underwent laparoscopic BSO as several weeks ago  Tumor was noted in excrescences on the ovarian surface and was removed without difficulty  Postoperatively the patient has done well  She has repeated her  which has dropped from 67 to now 18  She has undergone a CT scan which reveals the following:   IMPRESSION:     Solitary nonspecific 3 mm noncalcified right upper lobe nodule  Based on current Fleischner Society 2017 Guidelines on incidental pulmonary nodule, patients with a known malignancy are at increased risk of metastasis and should receive initial three   month follow-up chest CT      Nonspecific 7 mm omental nodule in the mid abdomen  This should also be reassessed with 3 month follow-up CT scan to ensure stability or resolution  The patient remains asymptomatic  She denies significant abdominal pain pelvic pain bowel bladder complaint nausea vomiting constipation diarrhea fevers chills are regular vaginal bleeding  Patient now presents for evaluation and management of low-grade ovarian borderline tumor  The following portions of the patient's history were reviewed and updated as appropriate: allergies, current medications, past family history, past medical history, past social history, past surgical history and problem list     Review of Systems   Constitutional: Negative  HENT: Negative  Eyes: Negative  Respiratory: Negative  Cardiovascular: Negative  Gastrointestinal: Negative  Endocrine: Negative  Genitourinary: Negative  Musculoskeletal: Negative  Skin: Negative  Neurological: Negative  Hematological: Negative  Psychiatric/Behavioral: Negative          Current Outpatient Prescriptions   Medication Sig Dispense Refill    Cholecalciferol (VITAMIN D PO) Take by mouth  levofloxacin (LEVAQUIN) 500 mg tablet Take 1 tablet (500 mg total) by mouth daily for 5 days 5 tablet 0    Magnesium 250 MG TABS Take 1 tablet by mouth daily      ondansetron (ZOFRAN-ODT) 4 mg disintegrating tablet Take 1 tablet (4 mg total) by mouth every 8 (eight) hours as needed for nausea or vomiting for up to 7 days 20 tablet 0    vitamin E, tocopherol, 400 units capsule Take 400 Units by mouth 2 (two) times a day       No current facility-administered medications for this visit  Objective:    Blood pressure 100/64, pulse 76, temperature (!) 96 5 °F (35 8 °C), resp  rate 18, height 5' 5" (1 651 m), weight 83 2 kg (183 lb 8 oz), not currently breastfeeding  Body mass index is 30 54 kg/m²  Body surface area is 1 91 meters squared  Physical Exam   Constitutional: She is oriented to person, place, and time  She appears well-developed and well-nourished  HENT:   Head: Normocephalic and atraumatic  Eyes: EOM are normal    Neck: Normal range of motion  Neck supple  No thyromegaly present  Cardiovascular: Normal rate, regular rhythm and normal heart sounds  Pulmonary/Chest: Effort normal and breath sounds normal    Abdominal: Soft  Bowel sounds are normal    Well healed laparoscopic incisions  Genitourinary:   Genitourinary Comments: Deferred   Musculoskeletal: Normal range of motion  Lymphadenopathy:     She has no cervical adenopathy  Neurological: She is alert and oriented to person, place, and time  Skin: Skin is warm and dry  Psychiatric: She has a normal mood and affect   Her behavior is normal        Lab Results   Component Value Date     18 6 01/09/2019     Lab Results   Component Value Date    K 3 8 01/21/2019    CL 98 (L) 01/21/2019    CO2 26 01/21/2019    BUN 10 01/21/2019    CREATININE 0 83 01/21/2019    GLUF 127 (H) 12/10/2018    CALCIUM 9 6 01/21/2019    AST 16 01/21/2019    ALT 23 01/21/2019    ALKPHOS 99 01/21/2019    EGFR 84 01/21/2019     Lab Results Component Value Date    WBC 11 70 (H) 01/21/2019    HGB 13 1 01/21/2019    HCT 39 8 01/21/2019    MCV 97 01/21/2019     01/21/2019     Lab Results   Component Value Date    NEUTROABS 9 71 (H) 01/21/2019

## 2019-01-22 NOTE — ED PROVIDER NOTES
History  Chief Complaint   Patient presents with    Headache     Pt reports headache, fever, chills, body aches over last 3 days  Pt feels sob as well that started today  Denies chest pain   Shortness of Breath    Fever - 9 weeks to 76 years     52 yo female who was around someone with viral URI symptoms Saturday who began that night (48 hours ago) with body aches, fever and chills and headache (began gradual on global in nature) which have persisted  Did NOT get flu shot this year  Healthy but had b/l ovaries and fallopian tubes removed 5 weeks ago due to mass on ovary  Temp in triage 101 8 - given motrin and now 99  Decreased appetite and nauseas at times - not currently           History provided by:  Patient   used: No    Headache   Pain location:  Generalized  Quality:  Dull  Radiates to:  Does not radiate  Severity currently:  8/10  Severity at highest:  8/10  Onset quality:  Gradual  Duration:  2 days  Timing:  Constant  Progression:  Waxing and waning  Chronicity:  New  Similar to prior headaches: no    Relieved by:  Nothing  Worsened by:  Nothing  Ineffective treatments:  None tried  Associated symptoms: cough, fatigue, fever, myalgias, nausea and sore throat    Associated symptoms: no abdominal pain, no back pain, no congestion, no diarrhea, no neck pain, no neck stiffness, no paresthesias, no photophobia, no seizures and no vomiting    Cough:     Cough characteristics:  Dry    Sputum characteristics:  Nondescript    Severity:  Moderate    Onset quality:  Gradual    Duration:  2 days  Fatigue:     Severity:  Moderate    Duration:  2 days    Timing:  Constant  Fever:     Duration:  2 days    Timing:  Constant  Myalgias:     Location:  Generalized    Quality:  Aching    Severity:  Severe    Onset quality:  Gradual    Duration:  2 days    Timing:  Constant  Nausea:     Severity:  Mild    Onset quality:  Gradual    Duration:  2 days    Timing:  Intermittent    Progression: Resolved  Sore throat:     Severity:  Mild    Onset quality:  Gradual    Duration:  2 days    Timing:  Constant    Progression:  Unchanged  Shortness of Breath   Severity:  Mild  Onset quality:  Gradual  Duration:  2 days  Timing:  Intermittent  Chronicity:  New  Worsened by:  Nothing  Ineffective treatments:  None tried  Associated symptoms: cough, fever, headaches and sore throat    Associated symptoms: no abdominal pain, no chest pain, no neck pain, no rash and no vomiting    Fever - 9 weeks to 74 years   Associated symptoms: chills, cough, dysuria, headaches, myalgias, nausea and sore throat    Associated symptoms: no chest pain, no confusion, no congestion, no diarrhea, no rash and no vomiting        Prior to Admission Medications   Prescriptions Last Dose Informant Patient Reported? Taking? Cholecalciferol (VITAMIN D PO)   Yes Yes   Sig: Take by mouth   Magnesium 250 MG TABS   Yes Yes   Sig: Take 1 tablet by mouth daily   vitamin E, tocopherol, 400 units capsule   Yes Yes   Sig: Take 400 Units by mouth 2 (two) times a day      Facility-Administered Medications: None       Past Medical History:   Diagnosis Date    Obesity        Past Surgical History:   Procedure Laterality Date    [place] LAP,DIAGNOSTIC ABDOMEN N/A 12/10/2018    Procedure: LAPAROSCOPY DIAGNOSTIC, bilateral salpingo-oophorectomy,right ovarian cystectomy,pelvic washings;  Surgeon: Sherlyn Linares MD;  Location: [facility];  Service: Gynecology    SALPINGOOPHORECTOMY      TUBAL LIGATION         Family History   Problem Relation Age of Onset    No Known Problems Mother     No Known Problems Father      I have reviewed and agree with the history as documented  Social History   Substance Use Topics    Smoking status: Former Smoker    Smokeless tobacco: Never Used    Alcohol use No        Review of Systems   Constitutional: Positive for appetite change, chills, fatigue and fever  HENT: Positive for sore throat   Negative for congestion and trouble swallowing  Eyes: Negative for photophobia and visual disturbance  Respiratory: Positive for cough and shortness of breath  Negative for chest tightness  Cardiovascular: Negative for chest pain  Gastrointestinal: Positive for nausea  Negative for abdominal pain, diarrhea and vomiting  Genitourinary: Positive for dysuria  Negative for frequency, vaginal bleeding and vaginal discharge  Musculoskeletal: Positive for myalgias  Negative for back pain, neck pain and neck stiffness  Skin: Negative for pallor and rash  Allergic/Immunologic: Negative for immunocompromised state  Neurological: Positive for headaches  Negative for seizures, light-headedness and paresthesias  Psychiatric/Behavioral: Negative for confusion  All other systems reviewed and are negative  Physical Exam  Physical Exam   Constitutional: She is oriented to person, place, and time  She appears well-developed and well-nourished  No distress  HENT:   Head: Normocephalic and atraumatic  Mouth/Throat: Oropharynx is clear and moist    Eyes: Pupils are equal, round, and reactive to light  EOM are normal    Neck: Normal range of motion  Neck supple  Cardiovascular: Normal rate and regular rhythm  No murmur heard  Pulmonary/Chest: Effort normal and breath sounds normal  No respiratory distress  She has no wheezes  Abdominal: Soft  Bowel sounds are normal  There is no tenderness  laproscopic sites well appearing   Musculoskeletal: Normal range of motion  Neurological: She is alert and oriented to person, place, and time  She displays normal reflexes  No cranial nerve deficit or sensory deficit  She exhibits normal muscle tone  Coordination normal    Skin: Skin is warm  Capillary refill takes less than 2 seconds  No rash noted  No pallor  Psychiatric: She has a normal mood and affect  Her behavior is normal    Nursing note and vitals reviewed        Vital Signs  ED Triage Vitals   Temperature Pulse Respirations Blood Pressure SpO2   01/21/19 1910 01/21/19 1910 01/21/19 1910 01/21/19 1910 01/21/19 1910   (!) 101 8 °F (38 8 °C) 95 20 132/73 99 %      Temp Source Heart Rate Source Patient Position - Orthostatic VS BP Location FiO2 (%)   01/21/19 1910 01/21/19 1910 01/21/19 2209 01/21/19 1910 --   Temporal Monitor Lying Right arm       Pain Score       01/21/19 2210       5           Vitals:    01/21/19 1910 01/21/19 2209 01/21/19 2323 01/22/19 0000   BP: 132/73 125/72 111/64 122/57   Pulse: 95 95 85 82   Patient Position - Orthostatic VS:  Lying Lying Lying       Visual Acuity      ED Medications  Medications   ibuprofen (MOTRIN) tablet 400 mg (400 mg Oral Given 1/21/19 1930)   sodium chloride 0 9 % bolus 1,000 mL (0 mL Intravenous Stopped 1/21/19 2334)   acetaminophen (TYLENOL) tablet 650 mg (650 mg Oral Given 1/21/19 2242)   dexamethasone (DECADRON) injection 10 mg (10 mg Intravenous Given 1/21/19 2254)   ketorolac (TORADOL) injection 15 mg (15 mg Intravenous Given 1/21/19 2252)   levofloxacin (LEVAQUIN) tablet 750 mg (750 mg Oral Given 1/22/19 0008)       Diagnostic Studies  Results Reviewed     Procedure Component Value Units Date/Time    Comprehensive metabolic panel [[id number]]  (Abnormal) Collected:  01/21/19 2243    Lab Status:  Final result Specimen:  Blood from Arm, Right Updated:  01/21/19 2331     Sodium 137 mmol/L      Potassium 3 8 mmol/L      Chloride 98 (L) mmol/L      CO2 26 mmol/L      ANION GAP 13 mmol/L      BUN 10 mg/dL      Creatinine 0 83 mg/dL      Glucose 123 mg/dL      Calcium 9 6 mg/dL      AST 16 U/L      ALT 23 U/L      Alkaline Phosphatase 99 U/L      Total Protein 8 1 g/dL      Albumin 4 4 g/dL      Total Bilirubin 1 10 (H) mg/dL      eGFR 84 ml/min/1 73sq m     Narrative:         National Kidney Disease Education Program recommendations are as follows:  GFR calculation is accurate only with a steady state creatinine  Chronic Kidney disease less than 60 ml/min/1 73 sq  meters  Kidney failure less than 15 ml/min/1 73 sq  meters  Urine Microscopic [[id number]]  (Abnormal) Collected:  01/21/19 2300    Lab Status:  Final result Specimen:  Urine from Urine, Clean Catch Updated:  01/21/19 2303     RBC, UA 0-1 (A) /hpf      WBC, UA 30-50 (A) /hpf      Epithelial Cells Occasional /hpf      Bacteria, UA Occasional /hpf     Urine culture [[id number]] Collected:  01/21/19 2300    Lab Status:   In process Specimen:  Urine from Urine, Clean Catch Updated:  01/21/19 2303    POCT urinalysis dipstick [[id number]]  (Abnormal) Resulted:  01/21/19 2252    Lab Status:  Final result Specimen:  Urine Updated:  01/21/19 2252    POCT pregnancy, urine [[id number]]  (Normal) Resulted:  01/21/19 2252    Lab Status:  Final result Updated:  01/21/19 2252     EXT PREG TEST UR (Ref: Negative) Negative    CBC and differential [[id number]]  (Abnormal) Collected:  01/21/19 2243    Lab Status:  Final result Specimen:  Blood from Arm, Right Updated:  01/21/19 2249     WBC 11 70 (H) Thousand/uL      RBC 4 11 Million/uL      Hemoglobin 13 1 g/dL      Hematocrit 39 8 %      MCV 97 fL      MCH 31 9 pg      MCHC 32 9 g/dL      RDW 12 8 %      MPV 11 3 fL      Platelets 937 Thousands/uL      nRBC 0 /100 WBCs      Neutrophils Relative 83 (H) %      Immat GRANS % 1 %      Lymphocytes Relative 9 (L) %      Monocytes Relative 7 %      Eosinophils Relative 0 %      Basophils Relative 0 %      Neutrophils Absolute 9 71 (H) Thousands/µL      Immature Grans Absolute 0 06 Thousand/uL      Lymphocytes Absolute 1 10 Thousands/µL      Monocytes Absolute 0 79 Thousand/µL      Eosinophils Absolute 0 01 Thousand/µL      Basophils Absolute 0 03 Thousands/µL     ED Urine Macroscopic [[id number]]  (Abnormal) Collected:  01/21/19 2300    Lab Status:  Final result Specimen:  Urine Updated:  01/21/19 2243     Color, UA Yellow     Clarity, UA Slightly Cloudy     pH, UA >=9 0 (H)     Leukocytes, UA Moderate (A)     Nitrite, UA Negative     Protein, UA Trace (A) mg/dl      Glucose, UA Negative mg/dl      Ketones, UA Negative mg/dl      Urobilinogen, UA 0 2 E U /dl      Bilirubin, UA Negative     Blood, UA Negative     Specific Gravity, UA 1 010    Narrative:       CLINITEK RESULT                 XR chest 2 views    (Results Pending)              Procedures  Procedures       Phone Contacts  ED Phone Contact    ED Course  ED Course as of Jan 22 0404   Mon Jan 21, 2019   2220 Pt seen and examined  52 yo female who was around someone with viral URI symptoms Saturday who began that night (48 hours ago) with body aches, fever and chills and headache (began gradual on global in nature) which have persisted  Did NOT get flu shot this year  Healthy but had b/l ovaries and fallopian tubes removed 5 weeks ago due to mass on ovary  Temp in triage 101 8 - given motrin and now 99  Decreased appetite and nauseas at times - not currently  Will give IVF, decadron, toradol 15mg and tylenol and check labs, rapid flu     2344 Chest xray neg  Urine moderate leuks, 30- 50 WBC, occasional bacteria  With pt c/o dysuria will treat  Tue Jan 22, 2019   0002 Lengthy discussion with pt - decided to treat with levaquin to cover UTI and cough                                    MDM  CritCare Time    Disposition  Final diagnoses:   Fever   Acute viral syndrome   Cough   Nausea   UTI (urinary tract infection)     Time reflects when diagnosis was documented in both MDM as applicable and the Disposition within this note     Time User Action Codes Description Comment    1/22/2019 12:00 AM Edis Concepcionk Add [R50 9] Fever     1/22/2019 12:00 AM Bowers Most M Add [B34 9] Acute viral syndrome     1/22/2019 12:00 AM Bowers Most M Add [R05] Cough     1/22/2019 12:00 AM Bowers Most M Add [R11 0] Nausea     1/22/2019 12:00 AM Bowers Most M Add [N39 0] UTI (urinary tract infection)       ED Disposition     ED Disposition Condition Comment    Discharge  Stacie Forbes discharge to home/self care  Condition at discharge: Good        Follow-up Information     Follow up With Specialties Details Why Dank Robles MD Family Medicine Schedule an appointment as soon as possible for a visit As needed [phone] [id number] Kirsty Zapata   [phone]  [phone]  [phone]            Discharge Medication List as of 1/22/2019 12:02 AM      START taking these medications    Details   benzonatate (TESSALON PERLES) 100 mg capsule Take 1 capsule (100 mg total) by mouth every 8 (eight) hours as needed for cough for up to 5 days, Starting Tue 1/22/2019, Until Sun 1/27/2019, Print      levofloxacin (LEVAQUIN) 500 mg tablet Take 1 tablet (500 mg total) by mouth daily for 5 days, Starting Tue 1/22/2019, Until Sun 1/27/2019, Print      ondansetron (ZOFRAN-ODT) 4 mg disintegrating tablet Take 1 tablet (4 mg total) by mouth every 8 (eight) hours as needed for nausea or vomiting for up to 7 days, Starting Tue 1/22/2019, Until Tue 1/29/2019, Print         CONTINUE these medications which have NOT CHANGED    Details   Cholecalciferol (VITAMIN D PO) Take by mouth, Historical Med      Magnesium 250 MG TABS Take 1 tablet by mouth daily, Historical Med      vitamin E, tocopherol, 400 units capsule Take 400 Units by mouth 2 (two) times a day, Historical Med           No discharge procedures on file      ED Provider  Electronically Signed by           Leidy Ontiveros DO  01/22/19 0958

## 2019-01-22 NOTE — DISCHARGE INSTRUCTIONS
Acute Cough   WHAT YOU NEED TO KNOW:   An acute cough can last up to 3 weeks  Common causes of an acute cough include a cold, allergies, or a lung infection  DISCHARGE INSTRUCTIONS:   Return to the emergency department if:   · You have trouble breathing or feel short of breath  · You cough up blood, or you see blood in your mucus  · You faint or feel weak or dizzy  · You have chest pain when you cough or take a deep breath  · You have new wheezing  Contact your healthcare provider if:   · You have a fever  · Your cough lasts longer than 4 weeks  · Your symptoms do not improve with treatment  · You have questions or concerns about your condition or care  Medicines:   · Medicines  may be needed to stop the cough, decrease swelling in your airways, or help open your airways  Medicine may also be given to help you cough up mucus  Ask your healthcare provider what over-the-counter medicines you can take  If you have an infection caused by bacteria, you may need antibiotics  · Take your medicine as directed  Contact your healthcare provider if you think your medicine is not helping or if you have side effects  Tell him or her if you are allergic to any medicine  Keep a list of the medicines, vitamins, and herbs you take  Include the amounts, and when and why you take them  Bring the list or the pill bottles to follow-up visits  Carry your medicine list with you in case of an emergency  Manage your symptoms:   · Do not smoke and stay away from others who smoke  Nicotine and other chemicals in cigarettes and cigars can cause lung damage and make your cough worse  Ask your healthcare provider for information if you currently smoke and need help to quit  E-cigarettes or smokeless tobacco still contain nicotine  Talk to your healthcare provider before you use these products  · Drink extra liquids as directed  Liquids will help thin and loosen mucus so you can cough it up   Liquids will also help prevent dehydration  Examples of good liquids to drink include water, fruit juice, and broth  Do not drink liquids that contain caffeine  Caffeine can increase your risk for dehydration  Ask your healthcare provider how much liquid to drink each day  · Rest as directed  Do not do activities that make your cough worse, such as exercise  · Use a humidifier or vaporizer  Use a cool mist humidifier or a vaporizer to increase air moisture in your home  This may make it easier for you to breathe and help decrease your cough  · Eat 2 to 5 mL of honey 2 times each day  Honey can help thin mucus and decrease your cough  · Use cough drops or lozenges  These can help decrease throat irritation and your cough  Follow up with your healthcare provider as directed:  Write down your questions so you remember to ask them during your visits  © 2017 [employer] Information is for End User's use only and may not be sold, redistributed or otherwise used for commercial purposes  All illustrations and images included in CareNotes® are the copyrighted property of A D [employer] M [employer]  or Constant[employer] Doyle  The above information is an  only  It is not intended as medical advice for individual conditions or treatments  Talk to your doctor, nurse or pharmacist before following any medical regimen to see if it is safe and effective for you  Fever in Adults   WHAT YOU NEED TO KNOW:   A fever is an increase in your body temperature  Normal body temperature is 98 6°F (37°C)  Fever is generally defined as greater than 100 4°F (38°C)  Common causes include an infection, injury, or disease such as arthritis  DISCHARGE INSTRUCTIONS:   Return to the emergency department if:   · Your fever does not go away or gets worse even after treatment  · You have a stiff neck and a bad headache  · You are confused   You may not be able to think clearly or remember things like you normally do      · Your heart beats faster than usual even after treatment  · You have shortness of breath or chest pain when you breathe  · You urinate small amounts or not at all  · Your skin, lips, or nails turn blue  Contact your healthcare provider if:   · You have abdominal pain or you feel bloated  · You have nausea or are vomiting  · You have pain or burning when you urinate, or you have pain in your back  · You have questions or concerns about your condition or care  Medicines: You may need any of the following:  · NSAIDs , such as ibuprofen, help decrease swelling, pain, and fever  This medicine is available with or without a doctor's order  NSAIDs can cause stomach bleeding or kidney problems in certain people  If you take blood thinner medicine, always ask if NSAIDs are safe for you  Always read the medicine label and follow directions  Do not give these medicines to children under 10months of age without direction from your child's healthcare provider  · Acetaminophen  decreases pain and fever  It is available without a doctor's order  Ask how much to take and how often to take it  Follow directions  Read the labels of all other medicines you are using to see if they also contain acetaminophen, or ask your doctor or pharmacist  Acetaminophen can cause liver damage if not taken correctly  Do not use more than 4 grams (4,000 milligrams) total of acetaminophen in one day  · Antibiotics  may be given if you have an infection caused by bacteria  · Take your medicine as directed  Contact your healthcare provider if you think your medicine is not helping or if you have side effects  Tell him of her if you are allergic to any medicine  Keep a list of the medicines, vitamins, and herbs you take  Include the amounts, and when and why you take them  Bring the list or the pill bottles to follow-up visits  Carry your medicine list with you in case of an emergency    Follow up with your healthcare provider as directed:  Write down your questions so you remember to ask them during your visits  Self-care:   · Drink more liquids as directed  A fever makes you sweat  This can increase your risk for dehydration  Liquids can help prevent dehydration  ¨ Drink at least 6 to 8 eight-ounce cups of clear liquids each day  Drink water, juice, or broth  Do not drink sports drinks  They may contain caffeine  ¨ Ask your healthcare provider if you should drink an oral rehydration solution (ORS)  An ORS has the right amounts of water, salts, and sugar you need to replace body fluids  · Dress in lightweight clothes  Shivers may be a sign that your fever is rising  Do not put extra blankets or clothes on  This may cause your fever to rise even higher  Dress in light, comfortable clothing  Use a lightweight blanket or sheet when you sleep  Change your clothes, blanket, or sheets if they get wet  · Cool yourself safely  Take a bath in cool or lukewarm water  Use an ice pack wrapped in a small towel or wet a washcloth with cool water  Place the ice pack or wet washcloth on your forehead or the back of your neck  © 2017 [employer]  Information is for End User's use only and may not be sold, redistributed or otherwise used for commercial purposes  All illustrations and images included in CareNotes® are the copyrighted property of [employer]  or Constantino Doyle  The above information is an  only  It is not intended as medical advice for individual conditions or treatments  Talk to your doctor, nurse or pharmacist before following any medical regimen to see if it is safe and effective for you  Urinary Tract Infection in Women   WHAT YOU NEED TO KNOW:   A urinary tract infection (UTI) is caused by bacteria that get inside your urinary tract  Most bacteria that enter your urinary tract come out when you urinate   If the bacteria stay in your urinary tract, you may get an infection  Your urinary tract includes your kidneys, ureters, bladder, and urethra  Urine is made in your kidneys, and it flows from the ureters to the bladder  Urine leaves the bladder through the urethra  A UTI is more common in your lower urinary tract, which includes your bladder and urethra  DISCHARGE INSTRUCTIONS:   Seek care immediately if:   · You are urinating very little or not at all  · You have a high fever with shaking chills  · You have side or back pain that gets worse  Contact your healthcare provider if:   · You have a fever  · You do not feel better after 2 days of taking antibiotics  · You are vomiting  · You have questions or concerns about your condition or care  Medicines:   · Antibiotics  help fight a bacterial infection  · Medicines  may be given to decrease pain and burning when you urinate  They will also help decrease the feeling that you need to urinate often  These medicines will make your urine orange or red  · Take your medicine as directed  Contact your healthcare provider if you think your medicine is not helping or if you have side effects  Tell him or her if you are allergic to any medicine  Keep a list of the medicines, vitamins, and herbs you take  Include the amounts, and when and why you take them  Bring the list or the pill bottles to follow-up visits  Carry your medicine list with you in case of an emergency  Follow up with your healthcare provider as directed:  Write down your questions so you remember to ask them during your visits  Prevent another UTI:   · Empty your bladder often  Urinate and empty your bladder as soon as you feel the need  Do not hold your urine for long periods of time  · Wipe from front to back after you urinate or have a bowel movement  This will help prevent germs from getting into your urinary tract through your urethra  · Drink liquids as directed    Ask how much liquid to drink each day and which liquids are best for you  You may need to drink more liquids than usual to help flush out the bacteria  Do not drink alcohol, caffeine, or citrus juices  These can irritate your bladder and increase your symptoms  Your healthcare provider may recommend cranberry juice to help prevent a UTI  · Urinate after you have sex  This can help flush out bacteria passed during sex  · Do not douche or use feminine deodorants  These can change the chemical balance in your vagina  · Change sanitary pads or tampons often  This will help prevent germs from getting into your urinary tract  · Do pelvic muscle exercises often  Pelvic muscle exercises may help you start and stop urinating  Strong pelvic muscles may help you empty your bladder easier  Squeeze these muscles tightly for 5 seconds like you are trying to hold back urine  Then relax for 5 seconds  Gradually work up to squeezing for 10 seconds  Do 3 sets of 15 repetitions a day, or as directed  © 2017 [employer] Information is for End User's use only and may not be sold, redistributed or otherwise used for commercial purposes  All illustrations and images included in CareNotes® are the copyrighted property of [employer] Inc  or Constantino Doyle  The above information is an  only  It is not intended as medical advice for individual conditions or treatments  Talk to your doctor, nurse or pharmacist before following any medical regimen to see if it is safe and effective for you  Viral Syndrome   WHAT YOU NEED TO KNOW:   Viral syndrome is a term used for a viral infection that has no clear cause  Viruses are spread easily from person to person through the air and on shared items  DISCHARGE INSTRUCTIONS:   Call 911 for the following:   · You have a seizure  · You cannot be woken  · You have chest pain or trouble breathing    Seek care immediately if:   · You have a stiff neck, a bad headache, and sensitivity to light      · You feel weak, dizzy, or confused  · You stop urinating or urinate a lot less than normal      · You cough up blood or thick, yellow or green, mucus  · You have severe abdominal pain or your abdomen is larger than usual   Contact your healthcare provider if:   · Your symptoms do not get better with treatment, or get worse, after 3 days  · You have a rash or ear pain  · You have burning when you urinate  · You have questions or concerns about your condition or care  Medicines: You may  need any of the following:  · Acetaminophen  decreases pain and fever  It is available without a doctor's order  Ask how much medicine to take and how often to take it  Follow directions  Acetaminophen can cause liver damage if not taken correctly  · NSAIDs , such as ibuprofen, help decrease swelling, pain, and fever  NSAIDs can cause stomach bleeding or kidney problems in certain people  If you take blood thinner medicine, always ask your healthcare provider if NSAIDs are safe for you  Always read the medicine label and follow directions  · Cold medicine  helps decrease swelling, control a cough, and relieve chest or nasal congestion  · Saline nasal spray  helps decrease nasal congestion  · Take your medicine as directed  Contact your healthcare provider if you think your medicine is not helping or if you have side effects  Tell him of her if you are allergic to any medicine  Keep a list of the medicines, vitamins, and herbs you take  Include the amounts, and when and why you take them  Bring the list or the pill bottles to follow-up visits  Carry your medicine list with you in case of an emergency  Manage your symptoms:   · Drink liquids as directed  to prevent dehydration  Ask how much liquid to drink each day and which liquids are best for you  Ask if you should drink an oral rehydration solution (ORS)   An ORS has the right amounts of water, salts, and sugar you need to replace body fluids  This may help prevent dehydration caused by vomiting or diarrhea  Do not drink liquids with caffeine  Drinks with caffeine can make dehydration worse  · Get plenty of rest  to help your body heal  Take naps throughout the day  Ask your healthcare provider when you can return to work and your normal activities  · Use a cool mist humidifier  to help you breathe easier if you have nasal or chest congestion  Ask your healthcare provider how to use a cool mist humidifier  · Eat honey or use cough drops  to help decrease throat discomfort  Ask your healthcare provider how much honey you should eat each day  Cough drops are available without a doctor's order  Follow directions for taking cough drops  · Do not smoke and stay away from others who smoke  Nicotine and other chemicals in cigarettes and cigars can cause lung damage  Smoking can also delay healing  Ask your healthcare provider for information if you currently smoke and need help to quit  E-cigarettes or smokeless tobacco still contain nicotine  Talk to your healthcare provider before you use these products  · Wash your hands frequently  to prevent the spread of germs to others  Use soap and water  Use gel hand  when soap and water are not available  Wash your hands after you use the bathroom, cough, or sneeze  Wash your hands before you prepare or eat food  Follow up with your healthcare provider as directed:  Write down your questions so you remember to ask them during your visits  © 2017 [employer] Information is for End User's use only and may not be sold, redistributed or otherwise used for commercial purposes  All illustrations and images included in CareNotes® are the copyrighted property of [employer]  or [employer]  The above information is an  only  It is not intended as medical advice for individual conditions or treatments   Talk to your doctor, nurse or pharmacist before following any medical regimen to see if it is safe and effective for you

## 2019-01-22 NOTE — ED NOTES
Pt states she does have chest pain that feels like pressure that started today        Scarlett Lee, BALTAZAR  01/21/19 1913

## 2019-01-22 NOTE — ASSESSMENT & PLAN NOTE
Patient is presently stable  She has excellent response in her serologies E of her  now to the normal range of 18  Her CT scan does indicate a 7 mm potential lesion in the omentum  It is unclear whether this is real or not  At this point I do not see a strong need for resection as this will not change the overall management of her disease  I have therefore recommended CT scan of the chest abdomen pelvis in 3 months and a  at that time

## 2019-01-23 ENCOUNTER — OFFICE VISIT (OUTPATIENT)
Dept: GYNECOLOGIC ONCOLOGY | Facility: CLINIC | Age: 49
End: 2019-01-23

## 2019-01-23 VITALS
SYSTOLIC BLOOD PRESSURE: 100 MMHG | TEMPERATURE: 96.5 F | HEART RATE: 76 BPM | RESPIRATION RATE: 18 BRPM | WEIGHT: 183.5 LBS | BODY MASS INDEX: 30.57 KG/M2 | DIASTOLIC BLOOD PRESSURE: 64 MMHG | HEIGHT: 65 IN

## 2019-01-23 DIAGNOSIS — C56.3 MALIGNANT NEOPLASM OF BOTH OVARIES (HCC): Primary | ICD-10-CM

## 2019-01-23 PROCEDURE — 99024 POSTOP FOLLOW-UP VISIT: CPT | Performed by: OBSTETRICS & GYNECOLOGY

## 2019-01-23 NOTE — LETTER
January 23, 2019     Yandy Bain MD  [facility]  [address] [facility]  [address]    Patient: Cheyenne Garcia   YOB: 1970   Date of Visit: 1/23/2019       Dear Dr Marce Yang:    Thank you for referring Karla Appiah to me for evaluation  Below are my notes for this consultation  If you have questions, please do not hesitate to call me  I look forward to following your patient along with you  Sincerely,        Mamadou Elizabeth MD        CC: MD Mamadou Sauceda MD  1/23/2019  2:50 PM  Sign at close encounter  Assessment/Plan:    Problem List Items Addressed This Visit     Malignant neoplasm of both ovaries [facility]) - Primary     Patient is presently stable  She has excellent response in her serologies E of her  now to the normal range of 18  Her CT scan does indicate a 7 mm potential lesion in the omentum  It is unclear whether this is real or not  At this point I do not see a strong need for resection as this will not change the overall management of her disease  I have therefore recommended CT scan of the chest abdomen pelvis in 3 months and a  at that time             Relevant Orders    CT chest abdomen pelvis w wo contrast                CHIEF COMPLAINT:  Follow-up for low-grade ovarian cancer stage I C3      Problem:  Cancer Staging  Malignant neoplasm of both ovaries ([facility] [address] )  Staging form: Ovary, Fallopian Tube, Primary Peritoneal, AJCC 8th Edition  - Clinical stage from 12/10/2018: FIGO Stage I (cT1, cN0, cM0) - Signed by Mamadou Elizabeth MD on 1/8/2019  - Pathologic stage from 12/10/2018: FIGO Stage IC3, calculated as Stage Unknown (pT1c3, pNX, cM0) - Signed by Mamadou Elizabeth MD on 1/8/2019        Previous therapy:     Malignant neoplasm of both ovaries ([address] )    12/10/2018 Initial Diagnosis     Malignant neoplasm of both ovaries ([address] )         12/10/2018 Surgery     Patient underwent laparoscopic bilateral salpingo-oophorectomy by Dr Colon  The patient was noted to have intraoperatively bilateral excrescences  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  Final pathology report indicates a stage I C3 final pathology report  There were malignant cells in the washings  A full staging was not performed as this was a borderline tumor  No evidence of further intra-abdominal tumor was seen laparoscopically   was elevated to 67 preoperatively  Current plan is to perform CT scan of the chest abdomen and pelvis and   If negative we will continue to observe  Patient ID: Kody Austin is a 53 y o  female  Patient presents today in follow-up for staging of stage I C3 incompletely staged borderline ovarian tumor  She underwent laparoscopic BSO as several weeks ago  Tumor was noted in excrescences on the ovarian surface and was removed without difficulty  Postoperatively the patient has done well  She has repeated her  which has dropped from 67 to now 18  She has undergone a CT scan which reveals the following:   IMPRESSION:     Solitary nonspecific 3 mm noncalcified right upper lobe nodule  Based on current Fleischner Society 2017 Guidelines on incidental pulmonary nodule, patients with a known malignancy are at increased risk of metastasis and should receive initial three   month follow-up chest CT      Nonspecific 7 mm omental nodule in the mid abdomen  This should also be reassessed with 3 month follow-up CT scan to ensure stability or resolution  The patient remains asymptomatic  She denies significant abdominal pain pelvic pain bowel bladder complaint nausea vomiting constipation diarrhea fevers chills are regular vaginal bleeding  Patient now presents for evaluation and management of low-grade ovarian borderline tumor          The following portions of the patient's history were reviewed and updated as appropriate: allergies, current medications, past family history, past medical history, past social history, past surgical history and problem list     Review of Systems   Constitutional: Negative  HENT: Negative  Eyes: Negative  Respiratory: Negative  Cardiovascular: Negative  Gastrointestinal: Negative  Endocrine: Negative  Genitourinary: Negative  Musculoskeletal: Negative  Skin: Negative  Neurological: Negative  Hematological: Negative  Psychiatric/Behavioral: Negative  Current Outpatient Prescriptions   Medication Sig Dispense Refill    Cholecalciferol (VITAMIN D PO) Take by mouth      levofloxacin (LEVAQUIN) 500 mg tablet Take 1 tablet (500 mg total) by mouth daily for 5 days 5 tablet 0    Magnesium 250 MG TABS Take 1 tablet by mouth daily      ondansetron (ZOFRAN-ODT) 4 mg disintegrating tablet Take 1 tablet (4 mg total) by mouth every 8 (eight) hours as needed for nausea or vomiting for up to 7 days 20 tablet 0    vitamin E, tocopherol, 400 units capsule Take 400 Units by mouth 2 (two) times a day       No current facility-administered medications for this visit  Objective:    Blood pressure 100/64, pulse 76, temperature (!) 96 5 °F (35 8 °C), resp  rate 18, height 5' 5" (1 651 m), weight 83 2 kg (183 lb 8 oz), not currently breastfeeding  Body mass index is 30 54 kg/m²  Body surface area is 1 91 meters squared  Physical Exam   Constitutional: She is oriented to person, place, and time  She appears well-developed and well-nourished  HENT:   Head: Normocephalic and atraumatic  Eyes: EOM are normal    Neck: Normal range of motion  Neck supple  No thyromegaly present  Cardiovascular: Normal rate, regular rhythm and normal heart sounds  Pulmonary/Chest: Effort normal and breath sounds normal    Abdominal: Soft  Bowel sounds are normal    Well healed laparoscopic incisions  Genitourinary:   Genitourinary Comments: Deferred   Musculoskeletal: Normal range of motion     Lymphadenopathy:     She has no cervical adenopathy  Neurological: She is alert and oriented to person, place, and time  Skin: Skin is warm and dry  Psychiatric: She has a normal mood and affect   Her behavior is normal        Lab Results   Component Value Date     18 6 01/09/2019     Lab Results   Component Value Date    K 3 8 01/21/2019    CL 98 (L) 01/21/2019    CO2 26 01/21/2019    BUN 10 01/21/2019    CREATININE 0 83 01/21/2019    GLUF 127 (H) 12/10/2018    CALCIUM 9 6 01/21/2019    AST 16 01/21/2019    ALT 23 01/21/2019    ALKPHOS 99 01/21/2019    EGFR 84 01/21/2019     Lab Results   Component Value Date    WBC 11 70 (H) 01/21/2019    HGB 13 1 01/21/2019    HCT 39 8 01/21/2019    MCV 97 01/21/2019     01/21/2019     Lab Results   Component Value Date    NEUTROABS 9 71 (H) 01/21/2019

## 2019-01-24 LAB — BACTERIA UR CULT: ABNORMAL

## 2019-04-17 ENCOUNTER — APPOINTMENT (OUTPATIENT)
Dept: LAB | Facility: HOSPITAL | Age: 49
End: 2019-04-17
Attending: OBSTETRICS & GYNECOLOGY
Payer: COMMERCIAL

## 2019-04-17 DIAGNOSIS — C56.3 MALIGNANT NEOPLASM OF BOTH OVARIES (HCC): ICD-10-CM

## 2019-04-17 PROCEDURE — 36415 COLL VENOUS BLD VENIPUNCTURE: CPT

## 2019-04-17 PROCEDURE — 86304 IMMUNOASSAY TUMOR CA 125: CPT

## 2019-04-18 LAB — CANCER AG125 SERPL-ACNC: 13 U/ML (ref 0–30)

## 2019-04-22 ENCOUNTER — HOSPITAL ENCOUNTER (OUTPATIENT)
Dept: CT IMAGING | Facility: HOSPITAL | Age: 49
Discharge: HOME | End: 2019-04-22
Attending: OBSTETRICS & GYNECOLOGY
Payer: COMMERCIAL

## 2019-04-22 DIAGNOSIS — C56.3 MALIGNANT NEOPLASM OF BOTH OVARIES (HCC): ICD-10-CM

## 2019-04-22 PROCEDURE — 71270 CT THORAX DX C-/C+: CPT

## 2019-04-22 PROCEDURE — 74178 CT ABD&PLV WO CNTR FLWD CNTR: CPT

## 2019-04-22 RX ADMIN — IOHEXOL 100 ML: 350 INJECTION, SOLUTION INTRAVENOUS at 20:05

## 2019-04-24 ENCOUNTER — OFFICE VISIT (OUTPATIENT)
Dept: GYNECOLOGIC ONCOLOGY | Facility: CLINIC | Age: 49
End: 2019-04-24
Payer: COMMERCIAL

## 2019-04-24 VITALS
HEART RATE: 76 BPM | WEIGHT: 185.5 LBS | RESPIRATION RATE: 16 BRPM | HEIGHT: 65 IN | DIASTOLIC BLOOD PRESSURE: 60 MMHG | BODY MASS INDEX: 30.91 KG/M2 | SYSTOLIC BLOOD PRESSURE: 100 MMHG | TEMPERATURE: 97.7 F

## 2019-04-24 DIAGNOSIS — C56.3 MALIGNANT NEOPLASM OF BOTH OVARIES (HCC): Primary | ICD-10-CM

## 2019-04-24 PROCEDURE — 99214 OFFICE O/P EST MOD 30 MIN: CPT | Performed by: OBSTETRICS & GYNECOLOGY

## 2019-04-24 RX ORDER — ACETAMINOPHEN 160 MG
1 TABLET,DISINTEGRATING ORAL
COMMUNITY

## 2019-05-13 ENCOUNTER — OFFICE VISIT (OUTPATIENT)
Dept: FAMILY MEDICINE CLINIC | Facility: CLINIC | Age: 49
End: 2019-05-13

## 2019-05-13 ENCOUNTER — APPOINTMENT (OUTPATIENT)
Dept: LAB | Facility: CLINIC | Age: 49
End: 2019-05-13
Payer: COMMERCIAL

## 2019-05-13 VITALS
DIASTOLIC BLOOD PRESSURE: 70 MMHG | BODY MASS INDEX: 30.16 KG/M2 | HEART RATE: 72 BPM | TEMPERATURE: 96.9 F | RESPIRATION RATE: 18 BRPM | WEIGHT: 181 LBS | OXYGEN SATURATION: 98 % | HEIGHT: 65 IN | SYSTOLIC BLOOD PRESSURE: 122 MMHG

## 2019-05-13 DIAGNOSIS — R73.01 IMPAIRED FASTING BLOOD SUGAR: Primary | ICD-10-CM

## 2019-05-13 DIAGNOSIS — E55.9 VITAMIN D DEFICIENCY: ICD-10-CM

## 2019-05-13 DIAGNOSIS — E66.09 CLASS 1 OBESITY DUE TO EXCESS CALORIES WITHOUT SERIOUS COMORBIDITY WITH BODY MASS INDEX (BMI) OF 30.0 TO 30.9 IN ADULT: ICD-10-CM

## 2019-05-13 DIAGNOSIS — R73.01 IMPAIRED FASTING BLOOD SUGAR: ICD-10-CM

## 2019-05-13 DIAGNOSIS — Z12.39 BREAST CANCER SCREENING: ICD-10-CM

## 2019-05-13 DIAGNOSIS — Z12.4 CERVICAL CANCER SCREENING: ICD-10-CM

## 2019-05-13 PROBLEM — E89.41 SYMPTOMATIC POSTSURGICAL MENOPAUSE: Status: ACTIVE | Noted: 2019-05-13

## 2019-05-13 PROBLEM — E89.41 HOT FLASHES DUE TO SURGICAL MENOPAUSE: Status: ACTIVE | Noted: 2019-05-13

## 2019-05-13 LAB
25(OH)D3 SERPL-MCNC: 34.4 NG/ML (ref 30–100)
ALBUMIN SERPL BCP-MCNC: 4.6 G/DL (ref 3.5–5)
ALP SERPL-CCNC: 108 U/L (ref 46–116)
ALT SERPL W P-5'-P-CCNC: 31 U/L (ref 12–78)
ANION GAP SERPL CALCULATED.3IONS-SCNC: 4 MMOL/L (ref 4–13)
AST SERPL W P-5'-P-CCNC: 17 U/L (ref 5–45)
BASOPHILS # BLD AUTO: 0.02 THOUSANDS/ΜL (ref 0–0.1)
BASOPHILS NFR BLD AUTO: 0 % (ref 0–1)
BILIRUB SERPL-MCNC: 0.85 MG/DL (ref 0.2–1)
BUN SERPL-MCNC: 15 MG/DL (ref 5–25)
CALCIUM SERPL-MCNC: 9.4 MG/DL (ref 8.3–10.1)
CHLORIDE SERPL-SCNC: 107 MMOL/L (ref 100–108)
CHOLEST SERPL-MCNC: 182 MG/DL (ref 50–200)
CO2 SERPL-SCNC: 27 MMOL/L (ref 21–32)
CREAT SERPL-MCNC: 0.73 MG/DL (ref 0.6–1.3)
CREAT UR-MCNC: 229 MG/DL
EOSINOPHIL # BLD AUTO: 0.03 THOUSAND/ΜL (ref 0–0.61)
EOSINOPHIL NFR BLD AUTO: 1 % (ref 0–6)
ERYTHROCYTE [DISTWIDTH] IN BLOOD BY AUTOMATED COUNT: 13 % (ref 11.6–15.1)
GFR SERPL CREATININE-BSD FRML MDRD: 98 ML/MIN/1.73SQ M
GLUCOSE P FAST SERPL-MCNC: 96 MG/DL (ref 65–99)
HCT VFR BLD AUTO: 43.4 % (ref 34.8–46.1)
HDLC SERPL-MCNC: 68 MG/DL (ref 40–60)
HGB BLD-MCNC: 14.6 G/DL (ref 11.5–15.4)
IMM GRANULOCYTES # BLD AUTO: 0.01 THOUSAND/UL (ref 0–0.2)
IMM GRANULOCYTES NFR BLD AUTO: 0 % (ref 0–2)
LDLC SERPL CALC-MCNC: 100 MG/DL (ref 0–100)
LYMPHOCYTES # BLD AUTO: 1.77 THOUSANDS/ΜL (ref 0.6–4.47)
LYMPHOCYTES NFR BLD AUTO: 36 % (ref 14–44)
MCH RBC QN AUTO: 32.4 PG (ref 26.8–34.3)
MCHC RBC AUTO-ENTMCNC: 33.6 G/DL (ref 31.4–37.4)
MCV RBC AUTO: 96 FL (ref 82–98)
MICROALBUMIN UR-MCNC: 23.8 MG/L (ref 0–20)
MICROALBUMIN/CREAT 24H UR: 10 MG/G CREATININE (ref 0–30)
MONOCYTES # BLD AUTO: 0.35 THOUSAND/ΜL (ref 0.17–1.22)
MONOCYTES NFR BLD AUTO: 7 % (ref 4–12)
NEUTROPHILS # BLD AUTO: 2.76 THOUSANDS/ΜL (ref 1.85–7.62)
NEUTS SEG NFR BLD AUTO: 56 % (ref 43–75)
NONHDLC SERPL-MCNC: 114 MG/DL
NRBC BLD AUTO-RTO: 0 /100 WBCS
PLATELET # BLD AUTO: 264 THOUSANDS/UL (ref 149–390)
PMV BLD AUTO: 12.2 FL (ref 8.9–12.7)
POTASSIUM SERPL-SCNC: 4.3 MMOL/L (ref 3.5–5.3)
PROT SERPL-MCNC: 8.5 G/DL (ref 6.4–8.2)
RBC # BLD AUTO: 4.51 MILLION/UL (ref 3.81–5.12)
SODIUM SERPL-SCNC: 138 MMOL/L (ref 136–145)
TRIGL SERPL-MCNC: 72 MG/DL
TSH SERPL DL<=0.05 MIU/L-ACNC: 1.87 UIU/ML (ref 0.36–3.74)
WBC # BLD AUTO: 4.94 THOUSAND/UL (ref 4.31–10.16)

## 2019-05-13 PROCEDURE — 85025 COMPLETE CBC W/AUTO DIFF WBC: CPT

## 2019-05-13 PROCEDURE — 82306 VITAMIN D 25 HYDROXY: CPT

## 2019-05-13 PROCEDURE — 99215 OFFICE O/P EST HI 40 MIN: CPT | Performed by: FAMILY MEDICINE

## 2019-05-13 PROCEDURE — 82570 ASSAY OF URINE CREATININE: CPT

## 2019-05-13 PROCEDURE — 84443 ASSAY THYROID STIM HORMONE: CPT

## 2019-05-13 PROCEDURE — 3725F SCREEN DEPRESSION PERFORMED: CPT | Performed by: FAMILY MEDICINE

## 2019-05-13 PROCEDURE — 80053 COMPREHEN METABOLIC PANEL: CPT

## 2019-05-13 PROCEDURE — 82043 UR ALBUMIN QUANTITATIVE: CPT

## 2019-05-13 PROCEDURE — 80061 LIPID PANEL: CPT

## 2019-05-13 PROCEDURE — 36415 COLL VENOUS BLD VENIPUNCTURE: CPT

## 2019-05-26 ENCOUNTER — HOSPITAL ENCOUNTER (EMERGENCY)
Facility: HOSPITAL | Age: 49
Discharge: HOME | End: 2019-05-26
Attending: EMERGENCY MEDICINE | Admitting: EMERGENCY MEDICINE
Payer: COMMERCIAL

## 2019-05-26 VITALS
WEIGHT: 180 LBS | BODY MASS INDEX: 29.95 KG/M2 | HEART RATE: 99 BPM | DIASTOLIC BLOOD PRESSURE: 81 MMHG | OXYGEN SATURATION: 98 % | TEMPERATURE: 99.2 F | SYSTOLIC BLOOD PRESSURE: 120 MMHG | RESPIRATION RATE: 16 BRPM

## 2019-05-26 DIAGNOSIS — R51.9 HEADACHE: ICD-10-CM

## 2019-05-26 DIAGNOSIS — B34.9 ACUTE VIRAL SYNDROME: Primary | ICD-10-CM

## 2019-05-26 PROCEDURE — 99283 EMERGENCY DEPT VISIT LOW MDM: CPT | Performed by: EMERGENCY MEDICINE

## 2019-05-26 PROCEDURE — 99283 EMERGENCY DEPT VISIT LOW MDM: CPT

## 2019-05-26 RX ORDER — ACETAMINOPHEN 325 MG/1
975 TABLET ORAL ONCE
Status: COMPLETED | OUTPATIENT
Start: 2019-05-26 | End: 2019-05-26

## 2019-05-26 RX ORDER — NAPROXEN 500 MG/1
500 TABLET ORAL 2 TIMES DAILY PRN
Qty: 14 TABLET | Refills: 0 | Status: SHIPPED | OUTPATIENT
Start: 2019-05-26 | End: 2019-08-06

## 2019-05-26 RX ADMIN — ACETAMINOPHEN 975 MG: 325 TABLET ORAL at 10:11

## 2019-07-12 ENCOUNTER — TELEPHONE (OUTPATIENT)
Dept: FAMILY MEDICINE CLINIC | Facility: CLINIC | Age: 49
End: 2019-07-12

## 2019-07-12 NOTE — TELEPHONE ENCOUNTER
Left Message in regard to patients out standing referral  Letter and orders mailed to address on file, detailing referral and where to call  If patient were to call the office please inform of missing referral and have them contact either central scheduling or the appropriate specialist office    Test or Specialist:Mammogram  Phone Number:[phone]

## 2019-07-29 ENCOUNTER — APPOINTMENT (OUTPATIENT)
Dept: LAB | Facility: HOSPITAL | Age: 49
End: 2019-07-29
Attending: OBSTETRICS & GYNECOLOGY
Payer: COMMERCIAL

## 2019-07-29 DIAGNOSIS — C56.3 MALIGNANT NEOPLASM OF BOTH OVARIES (HCC): ICD-10-CM

## 2019-07-29 LAB — CANCER AG125 SERPL-ACNC: 10 U/ML (ref 0–30)

## 2019-07-29 PROCEDURE — 86304 IMMUNOASSAY TUMOR CA 125: CPT

## 2019-07-29 PROCEDURE — 36415 COLL VENOUS BLD VENIPUNCTURE: CPT

## 2019-08-06 ENCOUNTER — OFFICE VISIT (OUTPATIENT)
Dept: GYNECOLOGIC ONCOLOGY | Facility: CLINIC | Age: 49
End: 2019-08-06
Payer: COMMERCIAL

## 2019-08-06 VITALS
BODY MASS INDEX: 29.41 KG/M2 | HEIGHT: 65 IN | TEMPERATURE: 98.2 F | RESPIRATION RATE: 18 BRPM | SYSTOLIC BLOOD PRESSURE: 102 MMHG | HEART RATE: 65 BPM | DIASTOLIC BLOOD PRESSURE: 74 MMHG | WEIGHT: 176.5 LBS

## 2019-08-06 DIAGNOSIS — C56.3 MALIGNANT NEOPLASM OF BOTH OVARIES (HCC): ICD-10-CM

## 2019-08-06 DIAGNOSIS — C56.9 MALIGNANT NEOPLASM OF OVARY, UNSPECIFIED LATERALITY (HCC): Primary | ICD-10-CM

## 2019-08-06 PROCEDURE — 99214 OFFICE O/P EST MOD 30 MIN: CPT | Performed by: OBSTETRICS & GYNECOLOGY

## 2019-08-06 NOTE — PROGRESS NOTES
Assessment/Plan:    Problem List Items Addressed This Visit        Genitourinary    Malignant neoplasm of both ovaries [facility])     Patient remains in a clinical remission  Her  is at its baseline of 10 now down from 18 several months ago  We will see her back in 3 months for repeat evaluation and repeat   With regard to hot flashes these are related to her surgical menopause  As the patient is able to manage this reasonably well symptomatically and there may be some risk adding estrogen we have recommended no hormone replacement therapy at this time  Other Visit Diagnoses     Malignant neoplasm of ovary, unspecified laterality ([facility] [address] )    -  Primary    Relevant Orders                CHIEF COMPLAINT:  Follow-up stage I borderline ovarian tumor status post BSO      Problem:  Cancer Staging  Malignant neoplasm of both ovaries ([facility] [address] )  Staging form: Ovary, Fallopian Tube, Primary Peritoneal, AJCC 8th Edition  - Clinical stage from 12/10/2018: FIGO Stage I (cT1, cN0, cM0) - Signed by Adriana Garcia MD on 1/8/2019  - Pathologic stage from 12/10/2018: FIGO Stage IC3, calculated as Stage Unknown (pT1c3, pNX, cM0) - Signed by Adriana Garcia MD on 1/8/2019        Previous therapy:     Malignant neoplasm of both ovaries ([facility] [address] )    12/10/2018 Initial Diagnosis     Malignant neoplasm of both ovaries ([facility] [address] )      12/10/2018 Surgery     Patient underwent laparoscopic bilateral salpingo-oophorectomy by Dr Colon  The patient was noted to have intraoperatively bilateral excrescences  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  Final pathology report indicates a stage I C3 final pathology report  There were malignant cells in the washings  A full staging was not performed as this was a borderline tumor  No evidence of further intra-abdominal tumor was seen laparoscopically   was elevated to 67 preoperatively    Current plan is to perform CT scan of the chest abdomen and pelvis and   If negative we will continue to observe  Patient ID: Julita Orellana is a 53 y o  female  Patient is a very pleasant 55-year-old white female status post bilateral salpingo-oophorectomy for an 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  This was performed in December of 2018  Her  was elevated at that time to approximately 65  Since the surgery it has come down to a baseline of approximately 10  Her most recent  in July of 2019 was 10  Today, the patient is doing well  She denies significant abdominal pain, pelvic pain, nausea, vomiting, constipation, diarrhea, fevers, chills, or vaginal bleeding  She does not hot flashes which continue  The following portions of the patient's history were reviewed and updated as appropriate: allergies, current medications, past family history, past medical history, past social history, past surgical history and problem list     Review of Systems   Constitutional: Negative  HENT: Negative  Eyes: Negative  Respiratory: Negative  Cardiovascular: Negative  Gastrointestinal: Negative  Endocrine: Negative  Genitourinary: Negative  Musculoskeletal: Negative  Skin: Negative  Neurological: Negative  Hematological: Negative  Psychiatric/Behavioral: Negative  Current Outpatient Medications   Medication Sig Dispense Refill    Calcium Carbonate-Vit D-Min (CALCIUM 1200 PO) Take by mouth      Cholecalciferol (VITAMIN D3) 2000 units capsule Take 1 capsule by mouth      Magnesium 250 MG TABS Take 1 tablet by mouth daily      vitamin E, tocopherol, 400 units capsule Take 400 Units by mouth 2 (two) times a day      ondansetron (ZOFRAN-ODT) 4 mg disintegrating tablet Take 1 tablet (4 mg total) by mouth every 8 (eight) hours as needed for nausea or vomiting for up to 7 days 20 tablet 0     No current facility-administered medications for this visit              Objective:    Blood pressure 102/74, pulse 65, temperature 98 2 °F (36 8 °C), resp  rate 18, height 5' 5" (1 651 m), weight 80 1 kg (176 lb 8 oz), not currently breastfeeding  Body mass index is 29 37 kg/m²  Body surface area is 1 88 meters squared  Physical Exam   Constitutional: She is oriented to person, place, and time  She appears well-developed and well-nourished  HENT:   Head: Normocephalic and atraumatic  Eyes: Pupils are equal, round, and reactive to light  EOM are normal    Neck: Normal range of motion  Neck supple  Cardiovascular: Normal rate, regular rhythm and normal heart sounds  Pulmonary/Chest: Effort normal and breath sounds normal  No respiratory distress  Abdominal: Soft  Bowel sounds are normal  She exhibits no distension and no ascites  There is no tenderness  There is no rigidity, no rebound and no guarding  Genitourinary:   Genitourinary Comments: -Normal external female genitalia, normal Bartholin's and [place]'s glands                  -Normal midline urethral meatus  No lesions notes                  -Bladder without fullness mass or tenderness                  -Vagina without lesion or discharge No significant cystocele or rectocele noted                  -Cervix normal appearing without visible lesions                  -Uterus with normal contour, mobility  No tenderness,                  -Adnexae surgically absent                  - Anus without fissure of lesion  No palpable masses within the pelvis   Musculoskeletal: Normal range of motion  Lymphadenopathy:     She has no cervical adenopathy (ca125)  She has no axillary adenopathy  Right: No inguinal and no supraclavicular adenopathy present  Left: No inguinal and no supraclavicular adenopathy present  Neurological: She is alert and oriented to person, place, and time  Skin: Skin is warm and dry  Psychiatric: She has a normal mood and affect   Her behavior is normal        Lab Results   Component Value Date     10 0 07/29/2019

## 2019-08-06 NOTE — ASSESSMENT & PLAN NOTE
Patient remains in a clinical remission  Her  is at its baseline of 10 now down from 18 several months ago  We will see her back in 3 months for repeat evaluation and repeat   With regard to hot flashes these are related to her surgical menopause  As the patient is able to manage this reasonably well symptomatically and there may be some risk adding estrogen we have recommended no hormone replacement therapy at this time

## 2019-08-06 NOTE — LETTER
August 6, 2019     John Rankin MD  [address]  [address] [employer]    Patient: Hema Langley   YOB: 1970   Date of Visit: 8/6/2019       Dear Dr Negrete Stands:    Thank you for referring Husam Clark to me for evaluation  Below are my notes for this consultation  If you have questions, please do not hesitate to call me  I look forward to following your patient along with you  Sincerely,        Jenny Barba MD        CC: No Recipients  Jenny Barba MD  8/6/2019  9:10 AM  Incomplete  Assessment/Plan:    Problem List Items Addressed This Visit        Genitourinary    Malignant neoplasm of both ovaries [facility])     Patient remains in a clinical remission  Her  is at its baseline of 10 now down from 18 several months ago  We will see her back in 3 months for repeat evaluation and repeat   With regard to hot flashes these are related to her surgical menopause  As the patient is able to manage this reasonably well symptomatically and there may be some risk adding estrogen we have recommended no hormone replacement therapy at this time             Other Visit Diagnoses     Malignant neoplasm of ovary, unspecified laterality ([address] )    -  Primary    Relevant Orders                CHIEF COMPLAINT:  Follow-up stage I borderline ovarian tumor status post BSO      Problem:  Cancer Staging  Malignant neoplasm of both ovaries ([address] )  Staging form: Ovary, Fallopian Tube, Primary Peritoneal, AJCC 8th Edition  - Clinical stage from 12/10/2018: FIGO Stage I (cT1, cN0, cM0) - Signed by Jenny Barba MD on 1/8/2019  - Pathologic stage from 12/10/2018: FIGO Stage IC3, calculated as Stage Unknown (pT1c3, pNX, cM0) - Signed by Jenny Barba MD on 1/8/2019        Previous therapy:     Malignant neoplasm of both ovaries ([address] )    12/10/2018 Initial Diagnosis     Malignant neoplasm of both ovaries ([address] )      12/10/2018 Surgery     Patient underwent laparoscopic bilateral salpingo-oophorectomy by Dr Colon  The patient was noted to have intraoperatively bilateral excrescences  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  Final pathology report indicates a stage I C3 final pathology report  There were malignant cells in the washings  A full staging was not performed as this was a borderline tumor  No evidence of further intra-abdominal tumor was seen laparoscopically   was elevated to 67 preoperatively  Current plan is to perform CT scan of the chest abdomen and pelvis and   If negative we will continue to observe  Patient ID: Amol Tee is a 53 y o  female  Patient is a very pleasant 81-year-old white female status post bilateral salpingo-oophorectomy for an 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  This was performed in December of 2018  Her  was elevated at that time to approximately 65  Since the surgery it has come down to a baseline of approximately 10  Her most recent  in July of 2019 was 10  Today, the patient is doing well  She denies significant abdominal pain, pelvic pain, nausea, vomiting, constipation, diarrhea, fevers, chills, or vaginal bleeding  She does not hot flashes which continue  The following portions of the patient's history were reviewed and updated as appropriate: allergies, current medications, past family history, past medical history, past social history, past surgical history and problem list     Review of Systems   Constitutional: Negative  HENT: Negative  Eyes: Negative  Respiratory: Negative  Cardiovascular: Negative  Gastrointestinal: Negative  Endocrine: Negative  Genitourinary: Negative  Musculoskeletal: Negative  Skin: Negative  Neurological: Negative  Hematological: Negative  Psychiatric/Behavioral: Negative          Current Outpatient Medications   Medication Sig Dispense Refill    Calcium Carbonate-Vit D-Min (CALCIUM 1200 PO) Take by mouth      Cholecalciferol (VITAMIN D3) 2000 units capsule Take 1 capsule by mouth      Magnesium 250 MG TABS Take 1 tablet by mouth daily      vitamin E, tocopherol, 400 units capsule Take 400 Units by mouth 2 (two) times a day      ondansetron (ZOFRAN-ODT) 4 mg disintegrating tablet Take 1 tablet (4 mg total) by mouth every 8 (eight) hours as needed for nausea or vomiting for up to 7 days 20 tablet 0     No current facility-administered medications for this visit  Objective:    Blood pressure 102/74, pulse 65, temperature 98 2 °F (36 8 °C), resp  rate 18, height 5' 5" (1 651 m), weight 80 1 kg (176 lb 8 oz), not currently breastfeeding  Body mass index is 29 37 kg/m²  Body surface area is 1 88 meters squared  Physical Exam   Constitutional: She is oriented to person, place, and time  She appears well-developed and well-nourished  HENT:   Head: Normocephalic and atraumatic  Eyes: Pupils are equal, round, and reactive to light  EOM are normal    Neck: Normal range of motion  Neck supple  Cardiovascular: Normal rate, regular rhythm and normal heart sounds  Pulmonary/Chest: Effort normal and breath sounds normal  No respiratory distress  Abdominal: Soft  Bowel sounds are normal  She exhibits no distension and no ascites  There is no tenderness  There is no rigidity, no rebound and no guarding  Genitourinary:   Genitourinary Comments: -Normal external female genitalia, normal Bartholin's and [place]'s glands                  -Normal midline urethral meatus  No lesions notes                  -Bladder without fullness mass or tenderness                  -Vagina without lesion or discharge No significant cystocele or rectocele noted                  -Cervix normal appearing without visible lesions                  -Uterus with normal contour, mobility   No tenderness,                  -Adnexae surgically absent                  - Anus without fissure of lesion  No palpable masses within the pelvis   Musculoskeletal: Normal range of motion  Lymphadenopathy:     She has no cervical adenopathy (ca125)  She has no axillary adenopathy  Right: No inguinal and no supraclavicular adenopathy present  Left: No inguinal and no supraclavicular adenopathy present  Neurological: She is alert and oriented to person, place, and time  Skin: Skin is warm and dry  Psychiatric: She has a normal mood and affect   Her behavior is normal        Lab Results   Component Value Date     10 0 07/29/2019

## 2019-11-11 ENCOUNTER — APPOINTMENT (OUTPATIENT)
Dept: LAB | Facility: HOSPITAL | Age: 49
End: 2019-11-11
Attending: OBSTETRICS & GYNECOLOGY
Payer: COMMERCIAL

## 2019-11-11 DIAGNOSIS — C56.9 MALIGNANT NEOPLASM OF OVARY, UNSPECIFIED LATERALITY (HCC): ICD-10-CM

## 2019-11-11 LAB — CANCER AG125 SERPL-ACNC: 10.7 U/ML (ref 0–30)

## 2019-11-11 PROCEDURE — 86304 IMMUNOASSAY TUMOR CA 125: CPT

## 2019-11-11 PROCEDURE — 36415 COLL VENOUS BLD VENIPUNCTURE: CPT

## 2019-11-19 ENCOUNTER — OFFICE VISIT (OUTPATIENT)
Dept: GYNECOLOGIC ONCOLOGY | Facility: CLINIC | Age: 49
End: 2019-11-19
Payer: COMMERCIAL

## 2019-11-19 VITALS
SYSTOLIC BLOOD PRESSURE: 90 MMHG | HEIGHT: 65 IN | TEMPERATURE: 97.9 F | WEIGHT: 180.5 LBS | RESPIRATION RATE: 18 BRPM | HEART RATE: 68 BPM | DIASTOLIC BLOOD PRESSURE: 72 MMHG | BODY MASS INDEX: 30.07 KG/M2

## 2019-11-19 DIAGNOSIS — E89.41 HOT FLASHES DUE TO SURGICAL MENOPAUSE: ICD-10-CM

## 2019-11-19 DIAGNOSIS — C56.9 MALIGNANT NEOPLASM OF OVARY, UNSPECIFIED LATERALITY (HCC): Primary | ICD-10-CM

## 2019-11-19 DIAGNOSIS — C56.3 MALIGNANT NEOPLASM OF BOTH OVARIES (HCC): ICD-10-CM

## 2019-11-19 PROCEDURE — 99214 OFFICE O/P EST MOD 30 MIN: CPT | Performed by: OBSTETRICS & GYNECOLOGY

## 2019-11-19 NOTE — PROGRESS NOTES
Assessment/Plan:    Problem List Items Addressed This Visit        Endocrine    Malignant neoplasm of both ovaries [facility])     Patient presents today for follow-up for 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  She is presently without evidence of recurrence of disease on exam   Her  is normal   She will follow up in 3 months for repeat evaluation or earlier if symptoms warrant  Other    Hot flashes due to surgical menopause     Patient's hot flashes are related to surgical menopause  She continues to have them  She is taking vitamin-D  It is unclear the connection between estrogen and borderline ovarian tumors  The patient has some quality of life issues but is otherwise stable  We will continue with present regimen and avoid estrogens for the time being  Other Visit Diagnoses     Malignant neoplasm of ovary, unspecified laterality ([facility] [address] )    -  Primary    Relevant Orders                CHIEF COMPLAINT:  Follow-up surveillance for borderline ovarian cancer      Problem:  Cancer Staging  Malignant neoplasm of both ovaries ([facility] [address] )  Staging form: Ovary, Fallopian Tube, Primary Peritoneal, AJCC 8th Edition  - Clinical stage from 12/10/2018: FIGO Stage I (cT1, cN0, cM0) - Signed by Fabian Rouse MD on 1/8/2019  - Pathologic stage from 12/10/2018: Dennys Wei Stage IC3, calculated as Stage Unknown (pT1c3, pNX, cM0) - Signed by Fabian Rouse MD on 1/8/2019        Previous therapy:     Malignant neoplasm of both ovaries ([facility] [address] )    12/10/2018 Initial Diagnosis     Malignant neoplasm of both ovaries ([facility] [address] )      12/10/2018 Surgery     Patient underwent laparoscopic bilateral salpingo-oophorectomy by Dr Colon  The patient was noted to have intraoperatively bilateral excrescences  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  Final pathology report indicates a stage I C3 final pathology report  There were malignant cells in the washings    A full staging was not performed as this was a borderline tumor  No evidence of further intra-abdominal tumor was seen laparoscopically   was elevated to 67 preoperatively  Current plan is to perform CT scan of the chest abdomen and pelvis and   If negative we will continue to observe  Patient ID: Mercy Monday is a 53 y o  female  Patient is a very pleasant 36-year-old female with a history of laparoscopic bilateral salpingo-oophorectomy for bilateral ovarian masses  Final pathology report revealed bilateral borderline tumors  She was stage IC  Postoperatively she did well she had no further symptomatology  Her  has dropped back into the normal range  It was elevated above 100  Her most recent  this past month was 10  She has been placed on vitamin D and calcium for menopausal symptoms  She is not on hormone replacement therapy  She continues with significant symptomatic hot flashes  Since her last visit she has no new complaints  Today, the patient is doing well  She denies significant abdominal pain, pelvic pain, nausea, vomiting, constipation, diarrhea, fevers, chills, or vaginal bleeding  The following portions of the patient's history were reviewed and updated as appropriate: allergies, current medications, past family history, past medical history, past surgical history and problem list     Review of Systems   Constitutional: Negative  HENT: Negative  Eyes: Negative  Respiratory: Negative  Cardiovascular: Negative  Gastrointestinal: Negative  Endocrine: Negative  Hot flashes   Genitourinary: Negative  Musculoskeletal: Negative  Skin: Negative  Neurological: Negative  Hematological: Negative  Psychiatric/Behavioral: Negative          Current Outpatient Medications   Medication Sig Dispense Refill    Calcium Carbonate-Vit D-Min (CALCIUM 1200 PO) Take by mouth      Cholecalciferol (VITAMIN D3) 2000 units capsule Take 1 capsule by mouth      Magnesium 250 MG TABS Take 1 tablet by mouth daily      vitamin E, tocopherol, 400 units capsule Take 400 Units by mouth 2 (two) times a day      ondansetron (ZOFRAN-ODT) 4 mg disintegrating tablet Take 1 tablet (4 mg total) by mouth every 8 (eight) hours as needed for nausea or vomiting for up to 7 days 20 tablet 0     No current facility-administered medications for this visit  Objective:    Blood pressure 90/72, pulse 68, temperature 97 9 °F (36 6 °C), resp  rate 18, height 5' 5" (1 651 m), weight 81 9 kg (180 lb 8 oz), not currently breastfeeding  Body mass index is 30 04 kg/m²  Body surface area is 1 89 meters squared  Physical Exam   Constitutional: She is oriented to person, place, and time  She appears well-developed and well-nourished  HENT:   Head: Normocephalic and atraumatic  Eyes: Pupils are equal, round, and reactive to light  EOM are normal    Neck: Normal range of motion  Neck supple  Cardiovascular: Normal rate, regular rhythm and normal heart sounds  Pulmonary/Chest: Effort normal and breath sounds normal  No respiratory distress  Abdominal: Soft  Bowel sounds are normal  She exhibits no distension and no ascites  There is no tenderness  There is no rigidity, no rebound and no guarding  Genitourinary:   Genitourinary Comments: -Normal external female genitalia, normal Bartholin's and [place]'s glands                  -Normal midline urethral meatus  No lesions notes                  -Bladder without fullness mass or tenderness                  -Vagina without lesion or discharge No significant cystocele or rectocele noted                  -Cervix normal appearing without visible lesions                  -Uterus with normal contour, mobility  No tenderness,                  -Adnexae surgically absent                  - Anus without fissure of lesion     Musculoskeletal: Normal range of motion  Lymphadenopathy:     She has no cervical adenopathy       She has no axillary adenopathy  Right: No inguinal and no supraclavicular adenopathy present  Left: No inguinal and no supraclavicular adenopathy present  Neurological: She is alert and oriented to person, place, and time  Skin: Skin is warm and dry  Psychiatric: She has a normal mood and affect   Her behavior is normal        Lab Results   Component Value Date     10 7 11/11/2019     Lab Results   Component Value Date    K 4 3 05/13/2019     05/13/2019    CO2 27 05/13/2019    BUN 15 05/13/2019    CREATININE 0 73 05/13/2019    GLUF 96 05/13/2019    CALCIUM 9 4 05/13/2019    AST 17 05/13/2019    ALT 31 05/13/2019    ALKPHOS 108 05/13/2019    EGFR 98 05/13/2019     Lab Results   Component Value Date    WBC 4 94 05/13/2019    HGB 14 6 05/13/2019    HCT 43 4 05/13/2019    MCV 96 05/13/2019     05/13/2019     Lab Results   Component Value Date    NEUTROABS 2 76 05/13/2019

## 2019-11-19 NOTE — ASSESSMENT & PLAN NOTE
Patient presents today for follow-up for 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  She is presently without evidence of recurrence of disease on exam   Her  is normal   She will follow up in 3 months for repeat evaluation or earlier if symptoms warrant

## 2019-11-19 NOTE — ASSESSMENT & PLAN NOTE
Patient's hot flashes are related to surgical menopause  She continues to have them  She is taking vitamin-D  It is unclear the connection between estrogen and borderline ovarian tumors  The patient has some quality of life issues but is otherwise stable  We will continue with present regimen and avoid estrogens for the time being

## 2019-11-19 NOTE — LETTER
November 19, 2019     Sherrie Michele MD  [address]  [address] [facility]  [address]  [id number]    Patient: Ophelia Ahmadi   YOB: 1970   Date of Visit: 11/19/2019       Dear Dr Angelo Britton:    Thank you for referring Aracelis Bradley to me for evaluation  Below are my notes for this consultation  If you have questions, please do not hesitate to call me  I look forward to following your patient along with you  Sincerely,        Saloni Angeles MD        CC: No Recipients  Saloni Angeles MD  11/19/2019  2:26 PM  Sign at close encounter  Assessment/Plan:    Problem List Items Addressed This Visit        Endocrine    Malignant neoplasm of both ovaries [facility])     Patient presents today for follow-up for 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  She is presently without evidence of recurrence of disease on exam   Her  is normal   She will follow up in 3 months for repeat evaluation or earlier if symptoms warrant  Other    Hot flashes due to surgical menopause     Patient's hot flashes are related to surgical menopause  She continues to have them  She is taking vitamin-D  It is unclear the connection between estrogen and borderline ovarian tumors  The patient has some quality of life issues but is otherwise stable  We will continue with present regimen and avoid estrogens for the time being             Other Visit Diagnoses     Malignant neoplasm of ovary, unspecified laterality ([facility] [address] )    -  Primary    Relevant Orders                CHIEF COMPLAINT:  Follow-up surveillance for borderline ovarian cancer      Problem:  Cancer Staging  Malignant neoplasm of both ovaries ([facility] [address] )  Staging form: Ovary, Fallopian Tube, Primary Peritoneal, AJCC 8th Edition  - Clinical stage from 12/10/2018: FIGO Stage I (cT1, cN0, cM0) - Signed by Saloni Angeles MD on 1/8/2019  - Pathologic stage from 12/10/2018: FIGO Stage IC3, calculated as Stage Unknown (pT1c3, pNX, cM0) - Signed by Saloni Angeles MD on 1/8/2019        Previous therapy:     Malignant neoplasm of both ovaries ([facility] [address] )    12/10/2018 Initial Diagnosis     Malignant neoplasm of both ovaries ([facility] [address] )      12/10/2018 Surgery     Patient underwent laparoscopic bilateral salpingo-oophorectomy by Dr Colon  The patient was noted to have intraoperatively bilateral excrescences  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  Final pathology report indicates a stage I C3 final pathology report  There were malignant cells in the washings  A full staging was not performed as this was a borderline tumor  No evidence of further intra-abdominal tumor was seen laparoscopically   was elevated to 67 preoperatively  Current plan is to perform CT scan of the chest abdomen and pelvis and   If negative we will continue to observe  Patient ID: Osman Workman is a 53 y o  female  Patient is a very pleasant 45-year-old female with a history of laparoscopic bilateral salpingo-oophorectomy for bilateral ovarian masses  Final pathology report revealed bilateral borderline tumors  She was stage IC  Postoperatively she did well she had no further symptomatology  Her  has dropped back into the normal range  It was elevated above 100  Her most recent  this past month was 10  She has been placed on vitamin D and calcium for menopausal symptoms  She is not on hormone replacement therapy  She continues with significant symptomatic hot flashes  Since her last visit she has no new complaints  Today, the patient is doing well  She denies significant abdominal pain, pelvic pain, nausea, vomiting, constipation, diarrhea, fevers, chills, or vaginal bleeding        The following portions of the patient's history were reviewed and updated as appropriate: allergies, current medications, past family history, past medical history, past surgical history and problem list     Review of Systems   Constitutional: Negative  HENT: Negative  Eyes: Negative  Respiratory: Negative  Cardiovascular: Negative  Gastrointestinal: Negative  Endocrine: Negative  Hot flashes   Genitourinary: Negative  Musculoskeletal: Negative  Skin: Negative  Neurological: Negative  Hematological: Negative  Psychiatric/Behavioral: Negative  Current Outpatient Medications   Medication Sig Dispense Refill    Calcium Carbonate-Vit D-Min (CALCIUM 1200 PO) Take by mouth      Cholecalciferol (VITAMIN D3) 2000 units capsule Take 1 capsule by mouth      Magnesium 250 MG TABS Take 1 tablet by mouth daily      vitamin E, tocopherol, 400 units capsule Take 400 Units by mouth 2 (two) times a day      ondansetron (ZOFRAN-ODT) 4 mg disintegrating tablet Take 1 tablet (4 mg total) by mouth every 8 (eight) hours as needed for nausea or vomiting for up to 7 days 20 tablet 0     No current facility-administered medications for this visit  Objective:    Blood pressure 90/72, pulse 68, temperature 97 9 °F (36 6 °C), resp  rate 18, height 5' 5" (1 651 m), weight 81 9 kg (180 lb 8 oz), not currently breastfeeding  Body mass index is 30 04 kg/m²  Body surface area is 1 89 meters squared  Physical Exam   Constitutional: She is oriented to person, place, and time  She appears well-developed and well-nourished  HENT:   Head: Normocephalic and atraumatic  Eyes: Pupils are equal, round, and reactive to light  EOM are normal    Neck: Normal range of motion  Neck supple  Cardiovascular: Normal rate, regular rhythm and normal heart sounds  Pulmonary/Chest: Effort normal and breath sounds normal  No respiratory distress  Abdominal: Soft  Bowel sounds are normal  She exhibits no distension and no ascites  There is no tenderness  There is no rigidity, no rebound and no guarding     Genitourinary:   Genitourinary Comments: -Normal external female genitalia, normal Bartholin's and Witherbee's glands                  -Normal midline urethral meatus  No lesions notes                  -Bladder without fullness mass or tenderness                  -Vagina without lesion or discharge No significant cystocele or rectocele noted                  -Cervix normal appearing without visible lesions                  -Uterus with normal contour, mobility  No tenderness,                  -Adnexae surgically absent                  - Anus without fissure of lesion     Musculoskeletal: Normal range of motion  Lymphadenopathy:     She has no cervical adenopathy  She has no axillary adenopathy  Right: No inguinal and no supraclavicular adenopathy present  Left: No inguinal and no supraclavicular adenopathy present  Neurological: She is alert and oriented to person, place, and time  Skin: Skin is warm and dry  Psychiatric: She has a normal mood and affect   Her behavior is normal        Lab Results   Component Value Date     10 7 11/11/2019     Lab Results   Component Value Date    K 4 3 05/13/2019     05/13/2019    CO2 27 05/13/2019    BUN 15 05/13/2019    CREATININE 0 73 05/13/2019    GLUF 96 05/13/2019    CALCIUM 9 4 05/13/2019    AST 17 05/13/2019    ALT 31 05/13/2019    ALKPHOS 108 05/13/2019    EGFR 98 05/13/2019     Lab Results   Component Value Date    WBC 4 94 05/13/2019    HGB 14 6 05/13/2019    HCT 43 4 05/13/2019    MCV 96 05/13/2019     05/13/2019     Lab Results   Component Value Date    NEUTROABS 2 76 05/13/2019

## 2020-01-23 NOTE — DISCHARGE INSTRUCTIONS
Ovariectomía laparoscópica   LO QUE USTED DEBE SABER:   La ovariectomía laparoscópica es [place] para extraer [employer]  El cirujano utilizará un laparoscopio (gabbi sonda delgada con Loann George fatuma y Ragena Gilchrist de video diminuta en el extremo final karla un telescopio pequeño) e instrumentos quirúrgicos diminutos  Se podría utilizar gabbi máquina robótica que tiene unos brazos mecánicos que [employer] instrumentos  DESPUÉS DE SER DADO DE MAY:   Medicamentos:  Los siguientes medicamentos pueden  ser prescritos para usted:  · Los antiiflamatorios no esteroideos (AINEs) ayudan a reducir inflamación y dolor o fiebre  Mayela medicamento esta disponible con o sin gabbi receta médica  Los AINEs podrían causar sangrado estomacal o problemas en los riñones en [place]  Si usted esta tomando un anticoágulante, siempre  pregunte a fontanez proveedor de regis si los AINEs son seguros para usted  Antes de [employer], lonny siempre la etiqueta de información y siga olga lidia indicaciones  · Un analgésico  neto o reduce fontanez dolor  No espere hasta que fontanez dolor sea intenso antes de solicitar fontanez medicamento  · Apex olga lidia medicamentos karla se le haya indicado  Llame a fontanez proveedor de regis si usted piensa que fontanez medicamento no le está ayudando o si tiene efectos secundarios  Infórmele si es alérgico a cualquier medicamento  Mantenga gabbi lista vigente de los medicamentos, vitaminas, y hierbas que pardeep  [address] cantidades, la frecuencia con que los pardeep y por qué los pardeep  Traiga la lista o los envases de olga lidia medicamentos a las citas de seguimiento  Mantenga la lista consigo en kash de gabbi emergencia  Bote las listas [place]  Acuda a fontanez josue de control con fontanez médico de cabecera o cirujano karla se lo indicaron:  Podría ser necesario que usted regrese para que le retiren los puntos  Escriba todas las preguntas que tenga para que recuerde hacerlas veronique olga lidia citas médicas     El cuidado de la herida:   · No se quite las tiras o pegamento médico que se utilizo para cerrar la incisión veronique gabbi semana o karla se lo indique fontanez cirujano  · Mantenga la incisión quirúrgica limpia  Pregúntele a fontanez médico de cabecera o cirujano cual es el cuidado que debe tener con la herida  Fontanez médico le dirá cuando y karla limpiarla y revisarla por signos de infección  · Mantenga la incisión quirúrgica seca  No se tome gabbi ducha o baño en neelam por las siguientes 24 horas después de la [facility], o karla se lo indique fontanez médico de cabecera  Pregunte por cuánto tiempo será necesario que mantenga cubierta fontanez herida mientras se baña, y que debe usar para [place]  Revise fontanez herida por signos de infección, karla enrojecimiento o inflamación  Cuidar de sí misma:   · Usted puede tener dolor de garganta en kash hayan utilizado gabbi sonda para administrarle la anestesia veronique la cirugía  Use gabbi pastilla para la garganta para chupar o victorino gárgaras con agua tibia y sal para aliviar la irritación en fontanez garganta  · No levante objetos pesados por 6 semanas después de la [facility] o karla se lo indique fontanez médico de cabecera  · Pregunte a fontanez médico de cabecera cuando es apropiado que reanude olga lidia relaciones sexuales  Consulte con fontanez médico de cabecera o cirujano sí:   · Usted tiene fiebre  · Usted tiene dolor en fontanez hombro o espalda que no desaparece después de un par de días o se empeora  · Fontanez cicatriz está fabiano, inflamada o supurando pus  · Usted tiene preguntas o inquietudes sobre fontanez condición o [place]  Consiga atención médica inmediatamente o llame al 911 sí:   · Fontanez vendaje está empapado de katelyn  · Fontanez pierna se siente caliente, sensible, y dolorosa  Podría verse inflamada y [address]  · Usted se siente débil, mareada o se desmaya  · Usted se siente desfallecer, le falta el aire o tiene dolor en el pecho    © 2014 [address] is for End User's use only and may not be sold, redistributed or otherwise used for commercial purposes  All illustrations and images included in CareNotes® are the copyrighted property of A D A M , Inc  or Constantino Doyle  Esta información es sólo para uso en educación  Fontanez intención no es darle un consejo médico sobre enfermedades o tratamientos  Colsulte con fontanez Ladena Creed farmacéutico antes de seguir cualquier régimen médico para saber si es seguro y efectivo para usted  3

## 2020-02-17 ENCOUNTER — APPOINTMENT (OUTPATIENT)
Dept: LAB | Facility: HOSPITAL | Age: 50
End: 2020-02-17
Attending: OBSTETRICS & GYNECOLOGY
Payer: COMMERCIAL

## 2020-02-17 DIAGNOSIS — C56.9 MALIGNANT NEOPLASM OF OVARY, UNSPECIFIED LATERALITY (HCC): ICD-10-CM

## 2020-02-17 LAB — CANCER AG125 SERPL-ACNC: 11.7 U/ML (ref 0–30)

## 2020-02-17 PROCEDURE — 36415 COLL VENOUS BLD VENIPUNCTURE: CPT

## 2020-02-17 PROCEDURE — 86304 IMMUNOASSAY TUMOR CA 125: CPT

## 2020-02-19 ENCOUNTER — OFFICE VISIT (OUTPATIENT)
Dept: GYNECOLOGIC ONCOLOGY | Facility: CLINIC | Age: 50
End: 2020-02-19
Payer: COMMERCIAL

## 2020-02-19 VITALS
HEIGHT: 65 IN | SYSTOLIC BLOOD PRESSURE: 118 MMHG | TEMPERATURE: 98.4 F | RESPIRATION RATE: 18 BRPM | HEART RATE: 67 BPM | DIASTOLIC BLOOD PRESSURE: 76 MMHG | WEIGHT: 183 LBS | BODY MASS INDEX: 30.49 KG/M2

## 2020-02-19 DIAGNOSIS — Z12.39 BREAST CANCER SCREENING: ICD-10-CM

## 2020-02-19 DIAGNOSIS — N39.3 SUI (STRESS URINARY INCONTINENCE, FEMALE): ICD-10-CM

## 2020-02-19 DIAGNOSIS — C56.3 MALIGNANT NEOPLASM OF BOTH OVARIES (HCC): ICD-10-CM

## 2020-02-19 DIAGNOSIS — E66.09 CLASS 1 OBESITY DUE TO EXCESS CALORIES WITHOUT SERIOUS COMORBIDITY WITH BODY MASS INDEX (BMI) OF 30.0 TO 30.9 IN ADULT: ICD-10-CM

## 2020-02-19 DIAGNOSIS — C56.9 MALIGNANT NEOPLASM OF OVARY, UNSPECIFIED LATERALITY (HCC): Primary | ICD-10-CM

## 2020-02-19 PROCEDURE — 3008F BODY MASS INDEX DOCD: CPT | Performed by: OBSTETRICS & GYNECOLOGY

## 2020-02-19 PROCEDURE — 99214 OFFICE O/P EST MOD 30 MIN: CPT | Performed by: OBSTETRICS & GYNECOLOGY

## 2020-02-19 PROCEDURE — 1036F TOBACCO NON-USER: CPT | Performed by: OBSTETRICS & GYNECOLOGY

## 2020-02-19 NOTE — PROGRESS NOTES
Assessment/Plan:    Problem List Items Addressed This Visit        Endocrine    Malignant neoplasm of both ovaries [facility])     Patient continues to do well  She remains in a clinical remission Her  is within normal limits  She does have intermittent right lower quadrant sharp stabbing fleeting abdominal pain  This sounds as though this is adhesions  Her exam is unremarkable  At this point I have not recommended CT scan as this appears to be likely adhesive disease related to her surgery  Also her  was within normal limits  If her pain is persistent by next visit we will consider CT scan of the abdomen and pelvis  We will see the patient back in 3 months with  performed at that time              Other    Class 1 obesity due to excess calories without serious comorbidity with body mass index (BMI) of 30 0 to 30 9 in adult    Relevant Orders    Ambulatory referral to Nutrition Services      Other Visit Diagnoses     Malignant neoplasm of ovary, unspecified laterality ([facility] )    -  Primary    Relevant Orders        TITUS (stress urinary incontinence, female)        Relevant Orders    Ambulatory referral to Urogynecology    Breast cancer screening        Relevant Orders    Mammo screening bilateral w 3d & cad            CHIEF COMPLAINT:  Surveillance ovarian cancer      Problem:  Cancer Staging  Malignant neoplasm of both ovaries ([facility] )  Staging form: Ovary, Fallopian Tube, Primary Peritoneal, AJCC 8th Edition  - Clinical stage from 12/10/2018: FIGO Stage I (cT1, cN0, cM0) - Signed by Ky Beck MD on 1/8/2019  - Pathologic stage from 12/10/2018: Di Payment Stage IC3, calculated as Stage Unknown (pT1c3, pNX, cM0) - Signed by Ky Beck MD on 1/8/2019        Previous therapy:     Malignant neoplasm of both ovaries ([facility] [address] )    12/10/2018 Initial Diagnosis     Malign[facility]ies [facility] )      12/10/2018 Surgery     Patient underwent laparoscopic bilateral salpingo-oophorectomy by Dr Colon  The patient was noted to have intraoperatively bilateral excrescences  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  Final pathology report indicates a stage I C3 final pathology report  There were malignant cells in the washings  A full staging was not performed as this was a borderline tumor  No evidence of further intra-abdominal tumor was seen laparoscopically   was elevated to 67 preoperatively  Current plan is to perform CT scan of the chest abdomen and pelvis and   If negative we will continue to observe  Patient ID: Isaura James is a 51 y o  female  Patient is very pleasant 21-year-old female with a history of stage I C3 borderline ovarian tumor  She underwent surgical resection in December of 2018 and has been observed ever since  Her  was elevated initially at 67  Since the time of her surgery this resolved  Her most recent  performed this week was 11  The patient complains of an eating disorder which is resulting in the patient's weight gain and BMI of over 30  They are interested in dietary consult  Today, the patient is doing well  She denies significant abdominal pain, pelvic pain, nausea, vomiting, constipation, diarrhea, fevers, chills, or vaginal bleeding  She has however noticed daily stress incontinence  She also notes occasional right lower quadrant pain when changing position which is fleeting short lived and sharp and stabbing  The following portions of the patient's history were reviewed and updated as appropriate: allergies, current medications, past family history, past social history, past surgical history and problem list     Review of Systems   Constitutional: Negative  HENT: Negative  Eyes: Negative  Respiratory: Negative  Cardiovascular: Negative  Gastrointestinal: Positive for abdominal pain          Fleeting right lower quadrant sharp stabbing intermittent pain   Endocrine: Negative  Genitourinary: Negative  Urinary incontinence   Musculoskeletal: Negative  Skin: Negative  Neurological: Negative  Hematological: Negative  Psychiatric/Behavioral: Negative  Current Outpatient Medications   Medication Sig Dispense Refill    Calcium Carbonate-Vit D-Min (CALCIUM 1200 PO) Take by mouth      Cholecalciferol (VITAMIN D3) 2000 units capsule Take 1 capsule by mouth      Magnesium 250 MG TABS Take 1 tablet by mouth daily      vitamin E, tocopherol, 400 units capsule Take 400 Units by mouth 2 (two) times a day       No current facility-administered medications for this visit  Objective:    Blood pressure 118/76, pulse 67, temperature 98 4 °F (36 9 °C), resp  rate 18, height 5' 5" (1 651 m), weight 83 kg (183 lb), not currently breastfeeding  Body mass index is 30 45 kg/m²  Body surface area is 1 9 meters squared      Physical Exam    Lab Results   Component Value Date     11 7 02/17/2020     Lab Results   Component Value Date    K 4 3 05/13/2019     05/13/2019    CO2 27 05/13/2019    BUN 15 05/13/2019    CREATININE 0 73 05/13/2019    GLUF 96 05/13/2019    CALCIUM 9 4 05/13/2019    AST 17 05/13/2019    ALT 31 05/13/2019    ALKPHOS 108 05/13/2019    EGFR 98 05/13/2019     Lab Results   Component Value Date    WBC 4 94 05/13/2019    HGB 14 6 05/13/2019    HCT 43 4 05/13/2019    MCV 96 05/13/2019     05/13/2019     Lab Results   Component Value Date    NEUTROABS 2 76 05/13/2019

## 2020-02-19 NOTE — ASSESSMENT & PLAN NOTE
Patient continues to do well  She remains in a clinical remission Her  is within normal limits  She does have intermittent right lower quadrant sharp stabbing fleeting abdominal pain  This sounds as though this is adhesions  Her exam is unremarkable  At this point I have not recommended CT scan as this appears to be likely adhesive disease related to her surgery  Also her  was within normal limits  If her pain is persistent by next visit we will consider CT scan of the abdomen and pelvis  We will see the patient back in 3 months with  performed at that time

## 2020-02-19 NOTE — LETTER
February 19, 2020     Shreya Ruth MD  [address]    Patient: Yanelis Pino   YOB: 1970   Date of Visit: 2/19/2020       Dear Dr Laura Doe: Thank you for referring Charlee Osullivan to me for evaluation  Below are my notes for this consultation  If you have questions, please do not hesitate to call me  I look forward to following your patient along with you  Sincerely,        Dorys Alexander MD        CC: MD Dorys Valladares MD  2/19/2020 10:53 AM  Sign at close encounter  Assessment/Plan:    Problem List Items Addressed This Visit        Endocrine    Malignant neoplasm of both ovaries [facility])     Patient continues to do well  She remains in a clinical remission Her  is within normal limits  She does have intermittent right lower quadrant sharp stabbing fleeting abdominal pain  This sounds as though this is adhesions  Her exam is unremarkable  At this point I have not recommended CT scan as this appears to be likely adhesive disease related to her surgery  Also her  was within normal limits  If her pain is persistent by next visit we will consider CT scan of the abdomen and pelvis  We will see the patient back in 3 months with  performed at that time              Other    Class 1 obesity due to excess calories without serious comorbidity with body mass index (BMI) of 30 0 to 30 9 in adult    Relevant Orders    Ambulatory referral to Nutrition Services      Other Visit Diagnoses     Malignant neoplasm of ovary, unspecified laterality ([facility] [address] )    -  Primary    Relevant Orders        TITUS (stress urinary incontinence, female)        Relevant Orders    Ambulatory referral to Urogynecology    Breast cancer screening        Relevant Orders    Mammo screening bilateral w 3d & cad            CHIEF COMPLAINT:  Surveillance ovarian cancer      Problem:  Cancer Staging  Malignant neoplasm of both ovaries [facility]  Staging form: Ovary, Fallopian Tube, Primary Peritoneal, AJCC 8th Edition  - Clinical stage from 12/10/2018: FIGO Stage I (cT1, cN0, cM0) - Signed by Jose Martin Whitaker MD on 1/8/2019  - Pathologic stage from 12/10/2018: FIGO Stage IC3, calculated as Stage Unknown (pT1c3, pNX, cM0) - Signed by Jose Martin Whitaker MD on 1/8/2019        Previous therapy:     Malignant neoplasm of both ovaries ([facility] [address] )    12/10/2018 Initial Diagnosis     Malignant neoplasm of both ovaries ([facility] [address] )      12/10/2018 Surgery     Patient underwent laparoscopic bilateral salpingo-oophorectomy by Dr Colon  The patient was noted to have intraoperatively bilateral excrescences  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  Final pathology report indicates a stage I C3 final pathology report  There were malignant cells in the washings  A full staging was not performed as this was a borderline tumor  No evidence of further intra-abdominal tumor was seen laparoscopically   was elevated to 67 preoperatively  Current plan is to perform CT scan of the chest abdomen and pelvis and   If negative we will continue to observe  Patient ID: Norma Wilson is a 51 y o  female  Patient is very pleasant 55-year-old female with a history of stage I C3 borderline ovarian tumor  She underwent surgical resection in December of 2018 and has been observed ever since  Her  was elevated initially at 67  Since the time of her surgery this resolved  Her most recent  performed this week was 11  The patient complains of an eating disorder which is resulting in the patient's weight gain and BMI of over 30  They are interested in dietary consult  Today, the patient is doing well  She denies significant abdominal pain, pelvic pain, nausea, vomiting, constipation, diarrhea, fevers, chills, or vaginal bleeding  She has however noticed daily stress incontinence    She also notes occasional right lower quadrant pain when changing position which is fleeting short lived and sharp and stabbing  The following portions of the patient's history were reviewed and updated as appropriate: allergies, current medications, past family history, past social history, past surgical history and problem list     Review of Systems   Constitutional: Negative  HENT: Negative  Eyes: Negative  Respiratory: Negative  Cardiovascular: Negative  Gastrointestinal: Positive for abdominal pain  Fleeting right lower quadrant sharp stabbing intermittent pain   Endocrine: Negative  Genitourinary: Negative  Urinary incontinence   Musculoskeletal: Negative  Skin: Negative  Neurological: Negative  Hematological: Negative  Psychiatric/Behavioral: Negative  Current Outpatient Medications   Medication Sig Dispense Refill    Calcium Carbonate-Vit D-Min (CALCIUM 1200 PO) Take by mouth      Cholecalciferol (VITAMIN D3) 2000 units capsule Take 1 capsule by mouth      Magnesium 250 MG TABS Take 1 tablet by mouth daily      vitamin E, tocopherol, 400 units capsule Take 400 Units by mouth 2 (two) times a day       No current facility-administered medications for this visit  Objective:    Blood pressure 118/76, pulse 67, temperature 98 4 °F (36 9 °C), resp  rate 18, height 5' 5" (1 651 m), weight 83 kg (183 lb), not currently breastfeeding  Body mass index is 30 45 kg/m²  Body surface area is 1 9 meters squared      Physical Exam    Lab Results   Component Value Date     11 7 02/17/2020     Lab Results   Component Value Date    K 4 3 05/13/2019     05/13/2019    CO2 27 05/13/2019    BUN 15 05/13/2019    CREATININE 0 73 05/13/2019    GLUF 96 05/13/2019    CALCIUM 9 4 05/13/2019    AST 17 05/13/2019    ALT 31 05/13/2019    ALKPHOS 108 05/13/2019    EGFR 98 05/13/2019     Lab Results   Component Value Date    WBC 4 94 05/13/2019    HGB 14 6 05/13/2019    HCT 43 4 05/13/2019    MCV 96 05/13/2019     05/13/2019     Lab Results   Component Value Date    NEUTROABS 2 76 05/13/2019

## 2020-02-20 ENCOUNTER — HOSPITAL ENCOUNTER (OUTPATIENT)
Dept: MAMMOGRAPHY | Facility: MEDICAL CENTER | Age: 50
Discharge: HOME | End: 2020-02-20
Payer: COMMERCIAL

## 2020-02-20 VITALS — HEIGHT: 65 IN | WEIGHT: 183 LBS | BODY MASS INDEX: 30.49 KG/M2

## 2020-02-20 DIAGNOSIS — Z12.39 BREAST CANCER SCREENING: ICD-10-CM

## 2020-02-20 PROCEDURE — 77063 BREAST TOMOSYNTHESIS BI: CPT

## 2020-02-20 PROCEDURE — 77067 SCR MAMMO BI INCL CAD: CPT

## 2020-05-21 ENCOUNTER — TELEPHONE (OUTPATIENT)
Dept: GYNECOLOGIC ONCOLOGY | Facility: CLINIC | Age: 50
End: 2020-05-21

## 2020-07-20 ENCOUNTER — APPOINTMENT (OUTPATIENT)
Dept: LAB | Facility: HOSPITAL | Age: 50
End: 2020-07-20
Payer: COMMERCIAL

## 2020-07-20 ENCOUNTER — TELEPHONE (OUTPATIENT)
Dept: HEMATOLOGY ONCOLOGY | Facility: CLINIC | Age: 50
End: 2020-07-20

## 2020-07-20 DIAGNOSIS — C56.9 MALIGNANT NEOPLASM OF OVARY, UNSPECIFIED LATERALITY (HCC): Primary | ICD-10-CM

## 2020-07-20 DIAGNOSIS — C56.9 MALIGNANT NEOPLASM OF OVARY, UNSPECIFIED LATERALITY (HCC): ICD-10-CM

## 2020-07-20 PROCEDURE — 36415 COLL VENOUS BLD VENIPUNCTURE: CPT

## 2020-07-20 PROCEDURE — 86304 IMMUNOASSAY TUMOR CA 125: CPT

## 2020-07-21 LAB — CANCER AG125 SERPL-ACNC: 10.5 U/ML (ref 0–30)

## 2020-07-22 ENCOUNTER — OFFICE VISIT (OUTPATIENT)
Dept: GYNECOLOGIC ONCOLOGY | Facility: CLINIC | Age: 50
End: 2020-07-22
Payer: COMMERCIAL

## 2020-07-22 VITALS
WEIGHT: 185.5 LBS | DIASTOLIC BLOOD PRESSURE: 82 MMHG | HEART RATE: 65 BPM | RESPIRATION RATE: 18 BRPM | BODY MASS INDEX: 30.91 KG/M2 | HEIGHT: 65 IN | SYSTOLIC BLOOD PRESSURE: 120 MMHG

## 2020-07-22 DIAGNOSIS — C56.3 MALIGNANT NEOPLASM OF BOTH OVARIES (HCC): ICD-10-CM

## 2020-07-22 DIAGNOSIS — C56.9 MALIGNANT NEOPLASM OF OVARY, UNSPECIFIED LATERALITY (HCC): Primary | ICD-10-CM

## 2020-07-22 PROCEDURE — 3008F BODY MASS INDEX DOCD: CPT | Performed by: OBSTETRICS & GYNECOLOGY

## 2020-07-22 PROCEDURE — 1036F TOBACCO NON-USER: CPT | Performed by: OBSTETRICS & GYNECOLOGY

## 2020-07-22 PROCEDURE — 99214 OFFICE O/P EST MOD 30 MIN: CPT | Performed by: OBSTETRICS & GYNECOLOGY

## 2020-07-22 NOTE — LETTER
July 22, 2020     [place], [address]  [address] [facility]  [address]  [id number]    Patient: Nina Flower   YOB: 1970   Date of Visit: 7/22/2020       Dear Dr Jacinda Brand:    Thank you for referring Natanael Montez to me for evaluation  Below are my notes for this consultation  If you have questions, please do not hesitate to call me  I look forward to following your patient along with you  Sincerely,        Jm Truong MD        CC: No Recipients  Jm Truong MD  7/22/2020  3:21 PM  Sign at close encounter  Assessment/Plan:    Problem List Items Addressed This Visit        Endocrine    Malignant neoplasm of both ovaries [facility])     Patient is a very pleasant 42-year-old female with 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  She was diagnosed approximately 20 months ago and has no evidence of recurrence  Her  remains normal     The patient continues to have significant hot flashes night sweats and heat intolerance  Unfortunately she is not a candidate for estrogen at this point  She is otherwise managing  She will follow up in 3 months for repeat evaluation or earlier if symptoms warrant  A  will be drawn at that time             Other Visit Diagnoses     Malignant neoplasm of ovary, unspecified laterality ([facility] [address] )    -  Primary    Relevant Orders                CHIEF COMPLAINT:  Surveillance ovarian borderline tumor      Problem:  Cancer Staging  Malignant neoplasm of both ovaries ([facility] [address] )  Staging form: Ovary, Fallopian Tube, Primary Peritoneal, AJCC 8th Edition  - Clinical stage from 12/10/2018: FIGO Stage I (cT1, cN0, cM0) - Signed by Jm Truong MD on 1/8/2019  - Pathologic stage from 12/10/2018: FIGO Stage IC3, calculated as Stage Unknown (pT1c3, pNX, cM0) - Signed by Jm Truong MD on 1/8/2019        Previous therapy:     Malignant neoplasm of both ovaries ([facility] [address] )    12/10/2018 Initial Diagnosis     Malignant neoplasm of both ovaries ([facility] [address] )      12/10/2018 Surgery     Patient underwent laparoscopic bilateral salpingo-oophorectomy by Dr Colon  The patient was noted to have intraoperatively bilateral excrescences  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  Final pathology report indicates a stage I C3 final pathology report  There were malignant cells in the washings  A full staging was not performed as this was a borderline tumor  No evidence of further intra-abdominal tumor was seen laparoscopically   was elevated to 67 preoperatively  Current plan is to perform CT scan of the chest abdomen and pelvis and   If negative we will continue to observe  Patient ID: Brionna Panchal is a 51 y o  female  Patient is very pleasant 60-year-old female status post BSO for bilateral borderline tumors of the ovary  Post surgery the patient has been without evidence of recurrence of disease  Pre-surgery the patient's  was elevated  This has been a marker to follow  The patient's most recent  remains normal at 8  The patient was last seen in February of 2020  Since that time she has no new complaint  She remains with significant hot flashes status post resection of ovaries  Today, the patient is doing well  She denies significant abdominal pain, pelvic pain, nausea, vomiting, constipation, diarrhea, fevers, chills, or vaginal bleeding  The following portions of the patient's history were reviewed and updated as appropriate: allergies, current medications, past medical history, past social history, past surgical history and problem list     Review of Systems   Constitutional: Negative  HENT: Negative  Eyes: Negative  Respiratory: Negative  Cardiovascular: Negative  Gastrointestinal: Negative  Endocrine: Positive for heat intolerance  Genitourinary: Negative  Musculoskeletal: Negative  Skin: Negative  Neurological: Negative  Hematological: Negative  Psychiatric/Behavioral: Negative  Current Outpatient Medications   Medication Sig Dispense Refill    Calcium Carbonate-Vit D-Min (CALCIUM 1200 PO) Take by mouth      Cholecalciferol (VITAMIN D3) 2000 units capsule Take 1 capsule by mouth      Magnesium 250 MG TABS Take 1 tablet by mouth daily      vitamin E, tocopherol, 400 units capsule Take 400 Units by mouth 2 (two) times a day       No current facility-administered medications for this visit  Objective:    Blood pressure 120/82, pulse 65, resp  rate 18, height 5' 5" (1 651 m), weight 84 1 kg (185 lb 8 oz), not currently breastfeeding  Body mass index is 30 87 kg/m²  Body surface area is 1 92 meters squared  Physical Exam   Constitutional: She is oriented to person, place, and time  She appears well-developed and well-nourished  HENT:   Head: Normocephalic and atraumatic  Eyes: Pupils are equal, round, and reactive to light  EOM are normal    Neck: Normal range of motion  Neck supple  Cardiovascular: Normal rate, regular rhythm and normal heart sounds  Pulmonary/Chest: Effort normal and breath sounds normal  No respiratory distress  Abdominal: Soft  Bowel sounds are normal  She exhibits no distension and no ascites  There is no tenderness  There is no rigidity, no rebound and no guarding  Genitourinary:   Genitourinary Comments: -Normal external female genitalia, normal Bartholin's and [place]'s glands                  -Normal midline urethral meatus  No lesions notes                  -Bladder without fullness mass or tenderness                  -Vagina without lesion or discharge No significant cystocele or rectocele noted                  -Cervix normal appearing without visible lesions                  -Uterus with normal contour, mobility  No tenderness,                  -Adnexae not palpated                  - Anus without fissure of lesion     Musculoskeletal: Normal range of motion     Lymphadenopathy:     She has no cervical adenopathy  She has no axillary adenopathy  Right: No inguinal and no supraclavicular adenopathy present  Left: No inguinal and no supraclavicular adenopathy present  Neurological: She is alert and oriented to person, place, and time  Skin: Skin is warm and dry  Psychiatric: She has a normal mood and affect   Her behavior is normal        Lab Results   Component Value Date     10 5 07/20/2020     Lab Results   Component Value Date    K 4 3 05/13/2019     05/13/2019    CO2 27 05/13/2019    BUN 15 05/13/2019    CREATININE 0 73 05/13/2019    GLUF 96 05/13/2019    CALCIUM 9 4 05/13/2019    AST 17 05/13/2019    ALT 31 05/13/2019    ALKPHOS 108 05/13/2019    EGFR 98 05/13/2019     Lab Results   Component Value Date    WBC 4 94 05/13/2019    HGB 14 6 05/13/2019    HCT 43 4 05/13/2019    MCV 96 05/13/2019     05/13/2019     Lab Results   Component Value Date    NEUTROABS 2 76 05/13/2019        Trend:  Lab Results   Component Value Date     10 5 07/20/2020     11 7 02/17/2020     10 7 11/11/2019     10 0 07/29/2019     13 0 04/17/2019     18 6 01/09/2019     67 2 (H) 11/29/2018

## 2020-07-22 NOTE — PROGRESS NOTES
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  She was diagnosed approximately 20 months ago and has no evidence of recurrence  Her  remains normal     The patient continues to have significant hot flashes night sweats and heat intolerance  Unfortunately she is not a candidate for estrogen at this point  She is otherwise managing  She will follow up in 3 months for repeat evaluation or earlier if symptoms warrant  A  will be drawn at that time  Other Visit Diagnoses     Malignant neoplasm of ovary, unspecified laterality ([facility] [address] )    -  Primary    Relevant Orders                CHIEF COMPLAINT:  Surveillance ovarian borderline tumor      Problem:  Cancer Staging  Malignant neoplasm of both ovaries ([facility] )  Staging form: Ovary, Fallopian Tube, Primary Peritoneal, AJCC 8th Edition  - Clinical stage from 12/10/2018: FIGO Stage I (cT1, cN0, cM0) - Signed by Aundrea Balderas MD on 1/8/2019  - Pathologic stage from 12/10/2018: FIGO Stage IC3, calculated as Stage Unknown (pT1c3, pNX, cM0) - Signed by Aundrea Balderas MD on 1/8/2019        Previous therapy:     Malignant neoplasm of both ovaries ([facility] [address] )    12/10/2018 Initial Diagnosis     Malignant neoplasm of both ovaries ([facility] [address] )      12/10/2018 Surgery     Patient underwent laparoscopic bilateral salpingo-oophorectomy by Dr Colon  The patient was noted to have intraoperatively bilateral excrescences  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  Final pathology report indicates a stage I C3 final pathology report  There were malignant cells in the washings  A full staging was not performed as this was a borderline tumor  No evidence of further intra-abdominal tumor was seen laparoscopically   was elevated to 67 preoperatively  Current plan is to perform CT scan of the chest abdomen and pelvis and   If negative we will continue to observe  Patient ID: Chetna Hathaway is a 51 y o  female  Patient is very pleasant 56-year-old female status post BSO for bilateral borderline tumors of the ovary  Post surgery the patient has been without evidence of recurrence of disease  Pre-surgery the patient's  was elevated  This has been a marker to follow  The patient's most recent  remains normal at 8  The patient was last seen in February of 2020  Since that time she has no new complaint  She remains with significant hot flashes status post resection of ovaries  Today, the patient is doing well  She denies significant abdominal pain, pelvic pain, nausea, vomiting, constipation, diarrhea, fevers, chills, or vaginal bleeding  The following portions of the patient's history were reviewed and updated as appropriate: allergies, current medications, past medical history, past social history, past surgical history and problem list     Review of Systems   Constitutional: Negative  HENT: Negative  Eyes: Negative  Respiratory: Negative  Cardiovascular: Negative  Gastrointestinal: Negative  Endocrine: Positive for heat intolerance  Genitourinary: Negative  Musculoskeletal: Negative  Skin: Negative  Neurological: Negative  Hematological: Negative  Psychiatric/Behavioral: Negative  Current Outpatient Medications   Medication Sig Dispense Refill    Calcium Carbonate-Vit D-Min (CALCIUM 1200 PO) Take by mouth      Cholecalciferol (VITAMIN D3) 2000 units capsule Take 1 capsule by mouth      Magnesium 250 MG TABS Take 1 tablet by mouth daily      vitamin E, tocopherol, 400 units capsule Take 400 Units by mouth 2 (two) times a day       No current facility-administered medications for this visit  Objective:    Blood pressure 120/82, pulse 65, resp   rate 18, height 5' 5" (1 651 m), weight 84 1 kg (185 lb 8 oz), not currently breastfeeding  Body mass index is 30 87 kg/m²  Body surface area is 1 92 meters squared  Physical Exam   Constitutional: She is oriented to person, place, and time  She appears well-developed and well-nourished  HENT:   Head: Normocephalic and atraumatic  Eyes: Pupils are equal, round, and reactive to light  EOM are normal    Neck: Normal range of motion  Neck supple  Cardiovascular: Normal rate, regular rhythm and normal heart sounds  Pulmonary/Chest: Effort normal and breath sounds normal  No respiratory distress  Abdominal: Soft  Bowel sounds are normal  She exhibits no distension and no ascites  There is no tenderness  There is no rigidity, no rebound and no guarding  Genitourinary:   Genitourinary Comments: -Normal external female genitalia, normal Bartholin's and [place]'s glands                  -Normal midline urethral meatus  No lesions notes                  -Bladder without fullness mass or tenderness                  -Vagina without lesion or discharge No significant cystocele or rectocele noted                  -Cervix normal appearing without visible lesions                  -Uterus with normal contour, mobility  No tenderness,                  -Adnexae not palpated                  - Anus without fissure of lesion     Musculoskeletal: Normal range of motion  Lymphadenopathy:     She has no cervical adenopathy  She has no axillary adenopathy  Right: No inguinal and no supraclavicular adenopathy present  Left: No inguinal and no supraclavicular adenopathy present  Neurological: She is alert and oriented to person, place, and time  Skin: Skin is warm and dry  Psychiatric: She has a normal mood and affect   Her behavior is normal        Lab Results   Component Value Date     10 5 07/20/2020     Lab Results   Component Value Date    K 4 3 05/13/2019     05/13/2019    CO2 27 05/13/2019    BUN 15 05/13/2019    CREATININE 0 73 05/13/2019    GLUF 96 05/13/2019    CALCIUM 9 4 05/13/2019    AST 17 05/13/2019    ALT 31 05/13/2019    ALKPHOS 108 05/13/2019    EGFR 98 05/13/2019     Lab Results   Component Value Date    WBC 4 94 05/13/2019    HGB 14 6 05/13/2019    HCT 43 4 05/13/2019    MCV 96 05/13/2019     05/13/2019     Lab Results   Component Value Date    NEUTROABS 2 76 05/13/2019        Trend:  Lab Results   Component Value Date     10 5 07/20/2020     11 7 02/17/2020     10 7 11/11/2019     10 0 07/29/2019     13 0 04/17/2019     18 6 01/09/2019     67 2 (H) 11/29/2018

## 2020-07-22 NOTE — ASSESSMENT & PLAN NOTE
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

## 2020-11-20 ENCOUNTER — LAB (OUTPATIENT)
Dept: LAB | Facility: HOSPITAL | Age: 50
End: 2020-11-20
Attending: OBSTETRICS & GYNECOLOGY
Payer: COMMERCIAL

## 2020-11-20 DIAGNOSIS — C56.9 MALIGNANT NEOPLASM OF OVARY, UNSPECIFIED LATERALITY (HCC): ICD-10-CM

## 2020-11-20 LAB — CANCER AG125 SERPL-ACNC: 9.3 U/ML (ref 0–30)

## 2020-11-20 PROCEDURE — 86304 IMMUNOASSAY TUMOR CA 125: CPT

## 2020-11-20 PROCEDURE — 36415 COLL VENOUS BLD VENIPUNCTURE: CPT

## 2020-11-23 ENCOUNTER — OFFICE VISIT (OUTPATIENT)
Dept: GYNECOLOGIC ONCOLOGY | Facility: CLINIC | Age: 50
End: 2020-11-23
Payer: COMMERCIAL

## 2020-11-23 VITALS
SYSTOLIC BLOOD PRESSURE: 110 MMHG | BODY MASS INDEX: 31.99 KG/M2 | HEIGHT: 65 IN | TEMPERATURE: 98.3 F | HEART RATE: 74 BPM | WEIGHT: 192 LBS | DIASTOLIC BLOOD PRESSURE: 72 MMHG

## 2020-11-23 DIAGNOSIS — Z85.43 PERSONAL HISTORY OF OVARIAN CANCER: Primary | ICD-10-CM

## 2020-11-23 DIAGNOSIS — N39.3 STRESS INCONTINENCE: ICD-10-CM

## 2020-11-23 DIAGNOSIS — C56.3 MALIGNANT NEOPLASM OF BOTH OVARIES (HCC): ICD-10-CM

## 2020-11-23 PROCEDURE — 99214 OFFICE O/P EST MOD 30 MIN: CPT | Performed by: OBSTETRICS & GYNECOLOGY

## 2020-11-23 PROCEDURE — 3008F BODY MASS INDEX DOCD: CPT | Performed by: OBSTETRICS & GYNECOLOGY

## 2020-11-23 PROCEDURE — 1036F TOBACCO NON-USER: CPT | Performed by: OBSTETRICS & GYNECOLOGY

## 2020-11-23 RX ORDER — FLUOXETINE HYDROCHLORIDE 20 MG/1
20 CAPSULE ORAL EVERY MORNING
COMMUNITY
Start: 2020-11-09

## 2020-11-23 RX ORDER — HYDROXYZINE HYDROCHLORIDE 25 MG/1
25 TABLET, FILM COATED ORAL
COMMUNITY
Start: 2020-11-09

## 2021-01-13 ENCOUNTER — OFFICE VISIT (OUTPATIENT)
Dept: FAMILY MEDICINE CLINIC | Facility: CLINIC | Age: 51
End: 2021-01-13

## 2021-01-13 VITALS
HEIGHT: 65 IN | BODY MASS INDEX: 32.82 KG/M2 | OXYGEN SATURATION: 96 % | SYSTOLIC BLOOD PRESSURE: 110 MMHG | WEIGHT: 197 LBS | HEART RATE: 72 BPM | TEMPERATURE: 97.8 F | RESPIRATION RATE: 18 BRPM | DIASTOLIC BLOOD PRESSURE: 82 MMHG

## 2021-01-13 DIAGNOSIS — T14.8XXA MUSCLE STRAIN: ICD-10-CM

## 2021-01-13 DIAGNOSIS — R10.12 LEFT UPPER QUADRANT ABDOMINAL PAIN: Primary | ICD-10-CM

## 2021-01-13 PROCEDURE — 99213 OFFICE O/P EST LOW 20 MIN: CPT | Performed by: PHYSICIAN ASSISTANT

## 2021-01-13 PROCEDURE — 1036F TOBACCO NON-USER: CPT | Performed by: PHYSICIAN ASSISTANT

## 2021-01-13 PROCEDURE — 3008F BODY MASS INDEX DOCD: CPT | Performed by: PHYSICIAN ASSISTANT

## 2021-01-13 NOTE — PATIENT INSTRUCTIONS
Distensión muscular   LO QUE NECESITA SABER:   Gabbi distensión muscular es gabbi torcedura, tirón o desgarre de un músculo o tendón  Un tendón es un tejido elástico ce que conecta un músculo a un hueso  Los signos de un músculo distendido incluyen moretones e inflamación en el área, dolor con el movimiento y pérdida de la fuerza:  INSTRUCCIONES SOBRE EL [facility]ITAL[facility]:   Regrese a la tiffanie de emergencias si:  · Usted repentinamente no siente o no puede  el músculo lesionado  Comuníquese con fontanez médico si:  · Fontanez dolor o inflamación empeoran o no desaparecen  · Usted tiene preguntas o inquietudes acerca de fontanez condición o cuidado  Medicamentos:  · [place], pueden disminuir la inflamación y el dolor o la [place]  Miguel medicamento está disponible con o sin gabbi receta médica  Los ZAIDA pueden causar sangrado estomacal o problemas renales en ciertas personas  Si usted pardeep un medicamento anticoagulante, siempre pregúntele a fontanez médico si los ZAIDA son seguros para usted  Siempre lonny la etiqueta de miguel medicamento y [place] instrucciones  · Relajantes musculares ayudan a reducir dolor y espasmos musculares  · [place] olga lidia medicamentos karla se le haya indicado  Consulte con fontanez médico si usted loreto que fontanez medicamento no le está ayudando o si presenta efectos secundarios  Infórmele si es alérgico a algún medicamento  Mantenga gabbi lista actualizada de los [place], las vitaminas y los productos herbales que pardeep  Incluya los siguientes datos de los medicamentos: cantidad, frecuencia y motivo de administración  Traiga con usted la lista o los envases de las píldoras a olga lidia citas de seguimiento  Lleve la lista de los medicamentos con usted en kash de gabbi emergencia  Acuda a olga lidia consultas de control con fontanez médico según le indicaron  Fontanez médico podría sugerirle que programe gabbi josue antes de regresar a olga lidia actividades normales   Anote olga lidia preguntas para que se acuerde de hacerlas veronique olga lidia visitas  Cuidados personales:  · De 3 a 7 días después de la lesión: Anson Osborne, Compresión y Elevación [facility]) para ayudar a detener los moretones y disminuir el dolor y la inflamación  ? Descanse: Descanse el músculo para permitir que la lesión sane  Cuando disminuya el dolor, comience a realizar movimientos normales y lentos  Para distensiones musculares leves y moderadas, usted debe descansar los músculos por lo menos 2 días  Sin embargo, si usted tiene gabbi distensión muscular severa, el descanso debe ser de 10 a 14 alex  Es posible que usted necesite muletas para caminar si la distensión muscular está en las piernas o en la parte inferior del cuerpo  ? Hielo: Coloque gabbi bolsa de hielo en el área lesionada  Coloque gabbi toalla entre el paquete de hielo y la piel  No coloque la bolsa de hielo directamente sobre la piel  Usted puede usar un paquete de chícharos congelados en vez de usar gabbi bolsa de hielo  ? Compresión: Es posible que usted necesite envolver un vendaje elástico alrededor del área para reducir la inflamación  Estas deben estar lo suficientemente apretadas karla para sentir sostén  No lo apriete demasiado  ? Elevación: Mantenga el músculo lesionado arriba del nivel del corazón si es posible  Por ejemplo, si usted tiene [employer] parte inferior de la pierna, acuéstese y apoye la pierna sobre almohadas  [place] ayuda a disminuir el dolor y la inflamación  · De 3 a 21 días después de la lesión: Comience lenta y regularmente a ejercitar el músculo con la distensión   [place] ayudará a que sane  Si siente dolor, disminuya la fuerza del ejercicio  · De 1 a 6 semanas después de la lesión: Estire el músculo lesionado  Estírelo alrededor de 30 segundos  Jason esto 4 veces al día  Usted puede estirar el músculo hasta que sienta un jalón pequeño  Suspenda el estiramiento del músculo si siente dolor      · De 2 semanas a 6 meses después de la lesión: La meta de esta fase es que usted regrese a la actividad que estaba haciendo antes que ocurriera la lesión sin lastimar el músculo  · De 3 semanas a 6 meses después de la lesión: Continúe estirando y fortaleciendo olga lidia músculos para evitar gabbi [place]  Aumente el tiempo y la distancia del ejercicio lentamente  Usted podría tener signos y síntomas de distensión muscular 6 meses después de la lesión, incluso si usted realiza actividades para ayudarla a sanar  En [employer], es posible que usted necesite cirugía en el músculo  © Copyright [employer] Information is for End User's use only and may not be sold, redistributed or otherwise used for commercial purposes  All illustrations and images included in CareNotes® are the copyrighted property of [employer]  or [address] [place] [address] es sólo para uso en educación  Fontanez intención no es darle un consejo médico sobre enfermedades o tratamientos  Colsulte con fontanez [place] farmacéutico antes de seguir cualquier régimen médico para saber si es seguro y efectivo para usted

## 2021-01-13 NOTE — PROGRESS NOTES
Assessment/Plan:    No problem-specific Assessment & Plan notes found for this encounter  Problem List Items Addressed This Visit     None      Visit Diagnoses     Left upper quadrant abdominal pain    -  Primary    Muscle strain      Recommend continuing with naproxen  If the pain still continues would recommend getting CT scan done  She will contact the office if the pain is not resolved by next week  Subjective:      Patient ID: Natanael Nichols is a 53 y o  female  HPI  43-year-old female with a history ovarian cancer here for same-day visit for left-sided abdomen pain  The pain started yesterday when she was bending down to pick something up  She took naproxen today  She thinks it did help  Pain is worse when she is bending forward or putting pressure on it  She denies any nausea, vomiting, changes in bowel movement or problems with urination  No fevers or chills  The following portions of the patient's history were reviewed and updated as appropriate:   She  has a past medical history of Obesity and Ovarian cancer ([facility] )  She   Patient Active Problem List    Diagnosis Date Noted    Hot flashes due to surgical menopause 05/13/2019    Symptomatic postsurgical menopause 05/13/2019    Mal[facility] of both ovaries ([facility] ) 01/08/2019    Generalized abdominal pain 07/25/2018    Impaired fasting blood sugar 04/26/2016    Vitamin D deficiency 08/25/2015    Dense breasts 05/17/2015    Class 1 obesity due to excess calories without serious comorbidity with body mass index (BMI) of 30 0 to 30 9 in adult 04/28/2015    Stress incontinence 04/28/2015     She  has a past surgical history that includes Tubal ligation; pr lap,diagnostic abdomen (N/A, 12/10/2018); Salpingoophorectomy; Hysterectomy (12/10/2018); and Breast biopsy (Right)  Her family history includes No Known Problems in her daughter, daughter, father, mother, and sister  She  reports that she has quit smoking   She has never used smokeless tobacco  She reports that she does not drink alcohol or use drugs  Current Outpatient Medications   Medication Sig Dispense Refill    Calcium Carbonate-Vit D-Min (CALCIUM 1200 PO) Take by mouth      Cholecalciferol (VITAMIN D3) 2000 units capsule Take 1 capsule by mouth      FLUoxetine (PROzac) 20 mg capsule Take 20 mg by mouth every morning      hydrOXYzine HCL (ATARAX) 25 mg tablet Take 25 mg by mouth daily at bedtime      Magnesium 250 MG TABS Take 1 tablet by mouth daily      vitamin E, tocopherol, 400 units capsule Take 400 Units by mouth 2 (two) times a day       No current facility-administered medications for this visit  Current Outpatient Medications on File Prior to Visit   Medication Sig    Calcium Carbonate-Vit D-Min (CALCIUM 1200 PO) Take by mouth    Cholecalciferol (VITAMIN D3) 2000 units capsule Take 1 capsule by mouth    FLUoxetine (PROzac) 20 mg capsule Take 20 mg by mouth every morning    hydrOXYzine HCL (ATARAX) 25 mg tablet Take 25 mg by mouth daily at bedtime    Magnesium 250 MG TABS Take 1 tablet by mouth daily    vitamin E, tocopherol, 400 units capsule Take 400 Units by mouth 2 (two) times a day     No current facility-administered medications on file prior to visit  She is allergic to sulfa antibiotics and nyquil multi-symptom [pseudoeph-doxylamine-dm-apap]     Review of Systems   Constitutional: Negative for chills and fever  Respiratory: Negative for cough and shortness of breath  Cardiovascular: Negative for chest pain  Gastrointestinal: Positive for abdominal pain  Negative for abdominal distention, constipation, diarrhea, nausea and vomiting  Genitourinary: Negative for difficulty urinating and dysuria  Musculoskeletal: Negative for back pain  Skin: Negative for rash           Objective:      /82 (BP Location: Left arm, Patient Position: Sitting, Cuff Size: Standard)   Pulse 72   Temp 97 8 °F (36 6 °C) (Temporal)   Resp 18   Ht 5' 5" (1 651 m)   Wt 89 4 kg (197 lb)   SpO2 96%   BMI 32 78 kg/m²          Physical Exam  Vitals signs and nursing note reviewed  Constitutional:       Appearance: She is well-developed  HENT:      Head: Normocephalic and atraumatic  Right Ear: External ear normal       Left Ear: External ear normal       Nose: Nose normal    Eyes:      Conjunctiva/sclera: Conjunctivae normal    Neck:      Musculoskeletal: Normal range of motion and neck supple  Thyroid: No thyromegaly  Cardiovascular:      Rate and Rhythm: Normal rate and regular rhythm  Heart sounds: Normal heart sounds  No murmur  Pulmonary:      Effort: Pulmonary effort is normal  No respiratory distress  Breath sounds: Normal breath sounds  Abdominal:      General: Bowel sounds are normal  There is no distension  Palpations: Abdomen is soft  There is no mass  Tenderness: There is abdominal tenderness (mild LUQ)  There is no guarding or rebound  Hernia: No hernia is present  Musculoskeletal: Normal range of motion  Lymphadenopathy:      Cervical: No cervical adenopathy  Skin:     General: Skin is warm  Neurological:      Mental Status: She is alert and oriented to person, place, and time     Psychiatric:         Mood and Affect: Mood normal          Behavior: Behavior normal

## 2021-02-03 ENCOUNTER — TELEMEDICINE (OUTPATIENT)
Dept: FAMILY MEDICINE CLINIC | Facility: CLINIC | Age: 51
End: 2021-02-03

## 2021-02-03 DIAGNOSIS — R50.9 FEVER WITH EXPOSURE TO COVID-19 VIRUS: Primary | ICD-10-CM

## 2021-02-03 DIAGNOSIS — Z20.822 FEVER WITH EXPOSURE TO COVID-19 VIRUS: Primary | ICD-10-CM

## 2021-02-03 PROCEDURE — 99213 OFFICE O/P EST LOW 20 MIN: CPT | Performed by: FAMILY MEDICINE

## 2021-02-03 NOTE — PROGRESS NOTES
COVID-19 Virtual Visit     Assessment/Plan:    Problem List Items Addressed This Visit        Other    Fever with exposure to COVID-19 virus - Primary    Relevant Orders    Novel Coronavirus (Covid-19),PCR [facility] - Collected at   [facility] or [facility]         Disposition:     I recommended self-quarantine for 10 days and to watch for symptoms until 14 days after exposure  If patient were to develop symptoms, they should self isolate and call our office for further guidance  I referred patient to one of our centralized sites for a COVID-19 swab  - Advised patient to take Vitamin C, Vitamin D and Zinc    - Advised to continue tylenol 1000mg BID for fever and muscle aches  Advised if she is going to use OTC cold medicine she should hold tylenol as these products may contain acetaminophen  I have spent 10 minutes directly with the patient  Greater than 50% of this time was spent in counseling/coordination of care regarding: prognosis, risks and benefits of treatment options, instructions for management and impressions  Encounter provider [address] [place] [address]    Provider located at [address] [place] [id number] [phone]    Recent Visits  No visits were found meeting these conditions  Showing recent visits within past 7 days and meeting all other requirements     Today's Visits  Date Type Provider Dept   02/03/21 Telemedicine  [facility]  Rhett Hinojosa   Showing today's visits and meeting all other requirements     Future Appointments  No visits were found meeting these conditions  Showing future appointments within next 150 days and meeting all other requirements        Patient agrees to participate in a virtual check in via telephone or video visit instead of presenting to the office to address urgent/immediate medical needs  Patient is aware this is a billable service      After connecting through Telephone, the patient was identified by name and date of birth  Ben Gerber was informed that this was a telemedicine visit and that the exam was being conducted confidentially over secure lines  My office door was closed  No one else was in the room  Ben Gerber acknowledged consent and understanding of privacy and security of the telemedicine visit  I informed the patient that I have reviewed her record in Epic and presented the opportunity for her to ask any questions regarding the visit today  The patient agreed to participate  Subjective:   Ben Gerber is a 53 y o  female who is concerned about COVID-19  Patient's symptoms include fever, chills, fatigue, shortness of breath, vomiting, diarrhea, myalgias and headache       Date of symptom onset: 1/30/2021  Date of exposure: 1/28/2021    Exposure:   Contact with a person who is under investigation (PUI) for or who is positive for COVID-19 within the last 14 days?: Yes    Hospitalized recently for fever and/or lower respiratory symptoms?: No      Currently a healthcare worker that is involved in direct patient care?: No      Works in a special setting where the risk of COVID-19 transmission may be high? (this may include long-term care, correctional and FDC facilities; homeless shelters; assisted-living facilities and group homes ): No      Resident in a special setting where the risk of COVID-19 transmission may be high? (this may include long-term care, correctional and FDC facilities; homeless shelters; assisted-living facilities and group homes ): No      No results found for: [address] [facility], [address] [facility], [address] [facility], [place] [address]  Past Medical History:   Diagnosis Date    Obesity     Ovarian cancer [facility])      Past Surgical History:   Procedure Laterality Date    BREAST BIOPSY Right     2010    HYSTERECTOMY  12/10/2018    IA LAP,DIAGNOSTIC ABDOMEN N/A 12/10/2018    Procedure: LAPAROSCOPY DIAGNOSTIC, bilateral salpingo-oophorectomy,right ovarian cystectomy,pelvic washings;  Surgeon: Brianda Douglass MD;  Location: [facility] OR;  Service: Gynecology    SALPINGOOPHORECTOMY      TUBAL LIGATION       Current Outpatient Medications   Medication Sig Dispense Refill    Calcium Carbonate-Vit D-Min (CALCIUM 1200 PO) Take by mouth      Cholecalciferol (VITAMIN D3) 2000 units capsule Take 1 capsule by mouth      FLUoxetine (PROzac) 20 mg capsule Take 20 mg by mouth every morning      hydrOXYzine HCL (ATARAX) 25 mg tablet Take 25 mg by mouth daily at bedtime      Magnesium 250 MG TABS Take 1 tablet by mouth daily      vitamin E, tocopherol, 400 units capsule Take 400 Units by mouth 2 (two) times a day       No current facility-administered medications for this visit  Allergies   Allergen Reactions    Sulfa Antibiotics Shortness Of Breath     Throat closes    Nyquil Multi-Symptom [Pseudoeph-Doxylamine-Dm-Apap] Throat Swelling       Review of Systems   Constitutional: Positive for chills, fatigue and fever  Respiratory: Positive for shortness of breath  Gastrointestinal: Positive for diarrhea and vomiting  Musculoskeletal: Positive for myalgias  Neurological: Positive for headaches  Objective: There were no vitals filed for this visit  Physical Exam  Constitutional:       General: She is not in acute distress  Pulmonary:      Effort: Pulmonary effort is normal  No respiratory distress  Neurological:      Mental Status: She is oriented to person, place, and time  VIRTUAL VISIT 15[address] Adin Davis acknowledges that she has consented to an online visit or consultation  She understands that the online visit is based solely on information provided by her, and that, in the absence of a face-to-face physical evaluation by the physician, the diagnosis she receives is both limited and provisional in terms of accuracy and completeness  This is not intended to replace a full medical face-to-face evaluation by the physician   Mikala Gupta Neftali understands and accepts these terms

## 2021-03-10 DIAGNOSIS — Z23 ENCOUNTER FOR IMMUNIZATION: ICD-10-CM

## 2021-03-15 ENCOUNTER — TELEPHONE (OUTPATIENT)
Dept: FAMILY MEDICINE CLINIC | Facility: CLINIC | Age: 51
End: 2021-03-15

## 2021-03-15 NOTE — TELEPHONE ENCOUNTER
Patient called in for possible UTI- offered same day but she said she was too busy for today so she schedule for Wednesday with Dr Saloni De Santiago PCP

## 2021-03-17 ENCOUNTER — OFFICE VISIT (OUTPATIENT)
Dept: FAMILY MEDICINE CLINIC | Facility: CLINIC | Age: 51
End: 2021-03-17

## 2021-03-17 VITALS
OXYGEN SATURATION: 99 % | BODY MASS INDEX: 32.93 KG/M2 | SYSTOLIC BLOOD PRESSURE: 124 MMHG | TEMPERATURE: 97.6 F | DIASTOLIC BLOOD PRESSURE: 80 MMHG | RESPIRATION RATE: 20 BRPM | WEIGHT: 197.9 LBS | HEART RATE: 78 BPM

## 2021-03-17 DIAGNOSIS — R30.0 DYSURIA: Primary | ICD-10-CM

## 2021-03-17 DIAGNOSIS — N30.00 ACUTE CYSTITIS WITHOUT HEMATURIA: ICD-10-CM

## 2021-03-17 LAB
SL AMB  POCT GLUCOSE, UA: NORMAL
SL AMB LEUKOCYTE ESTERASE,UA: NORMAL
SL AMB POCT BILIRUBIN,UA: NORMAL
SL AMB POCT BLOOD,UA: NORMAL
SL AMB POCT CLARITY,UA: CLEAR
SL AMB POCT COLOR,UA: YELLOW
SL AMB POCT KETONES,UA: NORMAL
SL AMB POCT NITRITE,UA: NORMAL
SL AMB POCT PH,UA: 7
SL AMB POCT SPECIFIC GRAVITY,UA: 1
SL AMB POCT URINE PROTEIN: NORMAL
SL AMB POCT UROBILINOGEN: NORMAL

## 2021-03-17 PROCEDURE — 1036F TOBACCO NON-USER: CPT | Performed by: FAMILY MEDICINE

## 2021-03-17 PROCEDURE — 87186 SC STD MICRODIL/AGAR DIL: CPT | Performed by: FAMILY MEDICINE

## 2021-03-17 PROCEDURE — 3725F SCREEN DEPRESSION PERFORMED: CPT | Performed by: FAMILY MEDICINE

## 2021-03-17 PROCEDURE — 81002 URINALYSIS NONAUTO W/O SCOPE: CPT | Performed by: FAMILY MEDICINE

## 2021-03-17 PROCEDURE — 87077 CULTURE AEROBIC IDENTIFY: CPT | Performed by: FAMILY MEDICINE

## 2021-03-17 PROCEDURE — 99203 OFFICE O/P NEW LOW 30 MIN: CPT | Performed by: FAMILY MEDICINE

## 2021-03-17 PROCEDURE — 87086 URINE CULTURE/COLONY COUNT: CPT | Performed by: FAMILY MEDICINE

## 2021-03-17 RX ORDER — NITROFURANTOIN 25; 75 MG/1; MG/1
100 CAPSULE ORAL 2 TIMES DAILY
Qty: 6 CAPSULE | Refills: 0 | Status: SHIPPED | OUTPATIENT
Start: 2021-03-17 | End: 2021-03-20

## 2021-03-17 NOTE — PROGRESS NOTES
Assessment:      Acute cystitis      Plan:  Plan:      1  Medications: nitrofurantoin  2  Maintain adequate hydration  3  Follow up if symptoms not improving, and prn  Subjective:      Angelo Richard is a 53 y o  female who complains of abnormal smelling urine, burning with urination, dysuria and incomplete bladder emptying for 3 days  Patient also complains of as above  Patient denies back pain, fever, headache and vaginal discharge  Patient does not have a history of recurrent UTI  Patient does not have a history of pyelonephritis  The following portions of the patient's history were reviewed and updated as appropriate:   She  has a past medical history of Obesity and Ovarian cancer ([facility] )  She   Patient Active Problem List    Diagnosis Date Noted    Fever with exposure to COVID-19 virus 02/03/2021    Hot flashes due to surgical menopause 05/13/2019    Symptomatic postsurgical menopause 05/13/2019    Malignant neoplasm of both ovaries ([facility] ) 01/08/2019    Generalized abdominal pain 07/25/2018    Impaired fasting blood sugar 04/26/2016    Vitamin D deficiency 08/25/2015    Dense breasts 05/17/2015    Class 1 obesity due to excess calories without serious comorbidity with body mass index (BMI) of 30 0 to 30 9 in adult 04/28/2015    Stress incontinence 04/28/2015     She  has a past surgical history that includes Tubal ligation; pr lap,diagnostic abdomen (N/A, 12/10/2018); Salpingoophorectomy; Hysterectomy (12/10/2018); and Breast biopsy (Right)  Her family history includes No Known Problems in her daughter, daughter, father, mother, and sister  She  reports that she has quit smoking  She has never used smokeless tobacco  She reports that she does not drink alcohol or use drugs    Current Outpatient Medications   Medication Sig Dispense Refill    Calcium Carbonate-Vit D-Min (CALCIUM 1200 PO) Take by mouth      Cholecalciferol (VITAMIN D3) 2000 units capsule Take 1 capsule by mouth      Magnesium 250 MG TABS Take 1 tablet by mouth daily      vitamin E, tocopherol, 400 units capsule Take 400 Units by mouth 2 (two) times a day      FLUoxetine (PROzac) 20 mg capsule Take 20 mg by mouth every morning      hydrOXYzine HCL (ATARAX) 25 mg tablet Take 25 mg by mouth daily at bedtime      nitrofurantoin (MACROBID) 100 mg capsule Take 1 capsule (100 mg total) by mouth 2 (two) times a day for 3 days 6 capsule 0     No current facility-administered medications for this visit  Current Outpatient Medications on File Prior to Visit   Medication Sig    Calcium Carbonate-Vit D-Min (CALCIUM 1200 PO) Take by mouth    Cholecalciferol (VITAMIN D3) 2000 units capsule Take 1 capsule by mouth    Magnesium 250 MG TABS Take 1 tablet by mouth daily    vitamin E, tocopherol, 400 units capsule Take 400 Units by mouth 2 (two) times a day    FLUoxetine (PROzac) 20 mg capsule Take 20 mg by mouth every morning    hydrOXYzine HCL (ATARAX) 25 mg tablet Take 25 mg by mouth daily at bedtime     No current facility-administered medications on file prior to visit       Review of Systems  Pertinent items are noted in HPI  Objective:      /80 (BP Location: Right arm, Patient Position: Sitting, Cuff Size: Standard)   Pulse 78   Temp 97 6 °F (36 4 °C) (Temporal)   Resp 20   Wt 89 8 kg (197 lb 14 4 oz)   SpO2 99%   BMI 32 93 kg/m²   General: alert and oriented, in no acute distress   Abdomen: soft, non-tender, without masses or organomegaly in the periumbilical area   Back: CVA tenderness absent   : defer exam     Laboratory:   Urine dipstick shows negative for all components  Micro exam: pending

## 2021-03-17 NOTE — PROGRESS NOTES
Assessment:      {uti/vaginitis:20015}      Plan:  Plan:      1  Medications: nitrofurantoin  2  Maintain adequate hydration  3  Follow up if symptoms not improving, and prn  Subjective:      Danya Diaz is a 53 y o  female who complains of abnormal smelling urine, burning with urination, dysuria, erythema of vaginal area, frequency, hesitancy and incomplete bladder emptying for 3 days  Patient also complains of dysuria  Patient denies back pain and fever  Patient does not have a history of recurrent UTI  Patient does not have a history of pyelonephritis  The following portions of the patient's history were reviewed and updated as appropriate:   She  has a past medical history of Obesity and Ovarian cancer ([facility] )  She   Patient Active Problem List    Diagnosis Date Noted    Fever with exposure to COVID-19 virus 02/03/2021    Hot flashes due to surgical menopause 05/13/2019    Symptomatic postsurgical menopause 05/13/2019    [facility] 01/08/2019    Generalized abdominal pain 07/25/2018    Impaired fasting blood sugar 04/26/2016    Vitamin D deficiency 08/25/2015    Dense breasts 05/17/2015    Class 1 obesity due to excess calories without serious comorbidity with body mass index (BMI) of 30 0 to 30 9 in adult 04/28/2015    Stress incontinence 04/28/2015     She  has a past surgical history that includes Tubal ligation; pr lap,diagnostic abdomen (N/A, 12/10/2018); Salpingoophorectomy; Hysterectomy (12/10/2018); and Breast biopsy (Right)  Her family history includes No Known Problems in her daughter, daughter, father, mother, and sister  She  reports that she has quit smoking  She has never used smokeless tobacco  She reports that she does not drink alcohol or use drugs    Current Outpatient Medications   Medication Sig Dispense Refill    Calcium Carbonate-Vit D-Min (CALCIUM 1200 PO) Take by mouth      Cholecalciferol (VITAMIN D3) 2000 units capsule Take 1 capsule by mouth      Magnesium 250 MG TABS Take 1 tablet by mouth daily      vitamin E, tocopherol, 400 units capsule Take 400 Units by mouth 2 (two) times a day      FLUoxetine (PROzac) 20 mg capsule Take 20 mg by mouth every morning      hydrOXYzine HCL (ATARAX) 25 mg tablet Take 25 mg by mouth daily at bedtime       No current facility-administered medications for this visit  Current Outpatient Medications on File Prior to Visit   Medication Sig    Calcium Carbonate-Vit D-Min (CALCIUM 1200 PO) Take by mouth    Cholecalciferol (VITAMIN D3) 2000 units capsule Take 1 capsule by mouth    Magnesium 250 MG TABS Take 1 tablet by mouth daily    vitamin E, tocopherol, 400 units capsule Take 400 Units by mouth 2 (two) times a day    FLUoxetine (PROzac) 20 mg capsule Take 20 mg by mouth every morning    hydrOXYzine HCL (ATARAX) 25 mg tablet Take 25 mg by mouth daily at bedtime     No current facility-administered medications on file prior to visit       Review of Systems  Pertinent items are noted in HPI  Objective:      /80 (BP Location: Right arm, Patient Position: Sitting, Cuff Size: Standard)   Pulse 78   Temp 97 6 °F (36 4 °C) (Temporal)   Resp 20   Wt 89 8 kg (197 lb 14 4 oz)   SpO2 99%   BMI 32 93 kg/m²   General: alert and oriented, in no acute distress   Abdomen: soft, non-tender, without masses or organomegaly in the periumbilical area   Back: CVA tenderness absent   : defer exam     Laboratory:   Urine dipstick shows negative for all components  Micro exam: pending  Assessment:      Acute cystitis      Plan:  Plan:      1  Medications: nitrofurantoin  2  Maintain adequate hydration  3  Follow up if symptoms not improving, and prn  Subjective:      Chandni Ocasio is a 53 y o  female who complains of abnormal smelling urine for 3 days  Patient also complains of {ros UTI:60139}  Patient denies {ros UTI:10466}  Patient {does/does not:43616} have a history of recurrent UTI  Patient {does/does not:68284} have a history of pyelonephritis  {Common ambulatory SmartLinks:45286}    Review of Systems  {ros - complete:04078}      Objective:      /80 (BP Location: Right arm, Patient Position: Sitting, Cuff Size: Standard)   Pulse 78   Temp 97 6 °F (36 4 °C) (Temporal)   Resp 20   Wt 89 8 kg (197 lb 14 4 oz)   SpO2 99%   BMI 32 93 kg/m²   General: {gen appear:99610}   Abdomen: {abd exam:86062::"soft, non-tender, without masses or organomegaly"} {abd  site:5010}   Back: {back exam:50570}   : {:57285}     Laboratory:   Urine dipstick shows {UA dip:99107}  Micro exam: {urine micro exam:6650}

## 2021-03-19 LAB — BACTERIA UR CULT: ABNORMAL

## 2021-03-27 ENCOUNTER — IMMUNIZATIONS (OUTPATIENT)
Dept: FAMILY MEDICINE CLINIC | Facility: HOSPITAL | Age: 51
End: 2021-03-27

## 2021-03-27 DIAGNOSIS — Z23 ENCOUNTER FOR IMMUNIZATION: Primary | ICD-10-CM

## 2021-03-27 PROCEDURE — 0001A SARS-COV-2 / COVID-19 MRNA VACCINE (PFIZER-BIONTECH) 30 MCG: CPT

## 2021-03-27 PROCEDURE — 91300 SARS-COV-2 / COVID-19 MRNA VACCINE (PFIZER-BIONTECH) 30 MCG: CPT

## 2021-04-18 ENCOUNTER — IMMUNIZATIONS (OUTPATIENT)
Dept: FAMILY MEDICINE CLINIC | Facility: HOSPITAL | Age: 51
End: 2021-04-18

## 2021-04-18 DIAGNOSIS — Z23 ENCOUNTER FOR IMMUNIZATION: Primary | ICD-10-CM

## 2021-04-18 PROCEDURE — 0002A SARS-COV-2 / COVID-19 MRNA VACCINE (PFIZER-BIONTECH) 30 MCG: CPT

## 2021-04-18 PROCEDURE — 91300 SARS-COV-2 / COVID-19 MRNA VACCINE (PFIZER-BIONTECH) 30 MCG: CPT

## 2021-05-17 ENCOUNTER — APPOINTMENT (OUTPATIENT)
Dept: LAB | Facility: HOSPITAL | Age: 51
End: 2021-05-17
Attending: OBSTETRICS & GYNECOLOGY
Payer: COMMERCIAL

## 2021-05-17 DIAGNOSIS — Z85.43 PERSONAL HISTORY OF OVARIAN CANCER: ICD-10-CM

## 2021-05-17 LAB — CANCER AG125 SERPL-ACNC: 9.9 U/ML (ref 0–30)

## 2021-05-17 PROCEDURE — 36415 COLL VENOUS BLD VENIPUNCTURE: CPT

## 2021-05-17 PROCEDURE — 86304 IMMUNOASSAY TUMOR CA 125: CPT

## 2021-05-24 ENCOUNTER — OFFICE VISIT (OUTPATIENT)
Dept: GYNECOLOGIC ONCOLOGY | Facility: CLINIC | Age: 51
End: 2021-05-24
Payer: COMMERCIAL

## 2021-05-24 VITALS
BODY MASS INDEX: 33.82 KG/M2 | HEIGHT: 65 IN | SYSTOLIC BLOOD PRESSURE: 118 MMHG | HEART RATE: 88 BPM | WEIGHT: 203 LBS | DIASTOLIC BLOOD PRESSURE: 76 MMHG | TEMPERATURE: 97.6 F

## 2021-05-24 DIAGNOSIS — C56.3 MALIGNANT NEOPLASM OF BOTH OVARIES (HCC): Primary | ICD-10-CM

## 2021-05-24 DIAGNOSIS — R63.5 WEIGHT GAIN: ICD-10-CM

## 2021-05-24 PROCEDURE — 1036F TOBACCO NON-USER: CPT | Performed by: OBSTETRICS & GYNECOLOGY

## 2021-05-24 PROCEDURE — 3008F BODY MASS INDEX DOCD: CPT | Performed by: OBSTETRICS & GYNECOLOGY

## 2021-05-24 PROCEDURE — 99214 OFFICE O/P EST MOD 30 MIN: CPT | Performed by: OBSTETRICS & GYNECOLOGY

## 2021-05-24 NOTE — ASSESSMENT & PLAN NOTE
Patient is very pleasant 26-year-old female with a history of borderline ovarian cancer stage I C3 status post laparoscopic resection of both ovaries and tubes  She has no evidence of recurrence of disease now over 2 years out from surgery  Her  remains normal her exam is unremarkable  She will follow up in 6 months for repeat evaluation  She has recently completed her mammogram      Patient continues to gain weight status post hysterectomy  She is interested in a nutrition consult  This will be ordered

## 2021-05-24 NOTE — PROGRESS NOTES
Assessment/Plan:    Problem List Items Addressed This Visit        Endocrine    Malignant neoplasm of both ovaries [facility]) - Primary       Patient is very pleasant 43-year-old female with a history of borderline ovarian cancer stage I C3 status post laparoscopic resection of both ovaries and tubes  She has no evidence of recurrence of disease now over 2 years out from surgery  Her  remains normal her exam is unremarkable  She will follow up in 6 months for repeat evaluation  She has recently completed her mammogram      Patient continues to gain weight status post hysterectomy  She is interested in a nutrition consult  This will be ordered  Relevant Orders        Ambulatory referral to Nutrition Services for Oncology      Other Visit Diagnoses     Weight gain        Relevant Orders    Ambulatory referral to Nutrition Services for Oncology            CHIEF COMPLAINT:   Surveillance borderline ovarian cancer      Problem:  Cancer Staging  Malignant neoplasm of both ovaries ([facility] [address] )  Staging form: Ovary, Fallopian Tube, Primary Peritoneal, AJCC 8th Edition  - Clinical stage from 12/10/2018: FIGO Stage I (cT1, cN0, cM0) - Signed by Bakari Proctor MD on 1/8/2019  - Pathologic stage from 12/10/2018: Mariajose Pa Stage IC3, calculated as Stage Unknown (pT1c3, pNX, cM0) - Signed by Bakari Proctor MD on 1/8/2019        Previous therapy:  Oncology History   Malignant neoplasm of both ovaries ([facility] [address] )   12/10/2018 Initial Diagnosis    Malignant neoplasm of both ovaries ([facility] [address] )     12/10/2018 Surgery    Patient underwent laparoscopic bilateral salpingo-oophorectomy by Dr Colon  The patient was noted to have intraoperatively bilateral excrescences  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  Final pathology report indicates a stage I C3 final pathology report  There were malignant cells in the washings  A full staging was not performed as this was a borderline tumor    No evidence of further intra-abdominal tumor was seen laparoscopically   was elevated to 67 preoperatively  Current plan is to perform CT scan of the chest abdomen and pelvis and   If negative we will continue to observe  Patient ID: Stacie Forbes is a 53 y o  female   Patient is very pleasant 59-year-old female with history of bilateral salpingo-oophorectomy for stage I borderline tumor of the ovary  This was performed by [facility]  She has been following up with me since that time  Her  has normalized and the patient has been having some difficulty due to menopausal status  She has last seen   Six months ago she has had no new complaint  Today, the patient is doing well  She denies significant abdominal pain, pelvic pain, nausea, vomiting, constipation, diarrhea, fevers, chills, or vaginal bleeding  She does still note hot flashes and night sweats which are bothersome to her  Her most recent  is stable at 9 9  Her most recent mammogram in February of 2020 was unremarkable  The patient did have a appointment for nutrition but did get COVID over the wintertime  She is recovering from that and would like to reinitiate this appointment  The following portions of the patient's history were reviewed and updated as appropriate: allergies, current medications, past family history, past medical history, past social history and past surgical history  Review of Systems   Constitutional: Negative  HENT: Negative  Eyes: Negative  Respiratory: Negative  Cardiovascular: Negative  Gastrointestinal: Negative  Endocrine: Negative  Hot flashes   Genitourinary: Negative  Musculoskeletal: Negative  Skin: Negative  Neurological: Negative  Hematological: Negative  Psychiatric/Behavioral: Negative          Current Outpatient Medications   Medication Sig Dispense Refill    Calcium Carbonate-Vit D-Min (CALCIUM 1200 PO) Take by mouth      Cholecalciferol (VITAMIN D3) 2000 units capsule Take 1 capsule by mouth      FLUoxetine (PROzac) 20 mg capsule Take 20 mg by mouth every morning      hydrOXYzine HCL (ATARAX) 25 mg tablet Take 25 mg by mouth daily at bedtime      Magnesium 250 MG TABS Take 1 tablet by mouth daily      vitamin E, tocopherol, 400 units capsule Take 400 Units by mouth 2 (two) times a day       No current facility-administered medications for this visit  Objective:    Blood pressure 118/76, pulse 88, temperature 97 6 °F (36 4 °C), temperature source Tympanic, height 5' 5" (1 651 m), weight 92 1 kg (203 lb), not currently breastfeeding  Body mass index is 33 78 kg/m²  Body surface area is 1 99 meters squared      Physical Exam    Lab Results   Component Value Date     9 9 05/17/2021        Trend:  Lab Results   Component Value Date     9 9 05/17/2021     9 3 11/20/2020     10 5 07/20/2020     11 7 02/17/2020     10 7 11/11/2019     10 0 07/29/2019     13 0 04/17/2019     18 6 01/09/2019     67 2 (H) 11/29/2018

## 2021-05-24 NOTE — LETTER
May 24, 2021     Marie Barcenas, [address]  [address] [facility]  [address]  [id number]    Patient: Aguila Herrera   YOB: 1970   Date of Visit: 5/24/2021       Dear Dr Michelle Burgess:    Thank you for referring Fco Robb to me for evaluation  Below are my notes for this consultation  If you have questions, please do not hesitate to call me  I look forward to following your patient along with you  Sincerely,        Eddie Singh MD        CC: No Recipients  Eddie Singh MD  5/24/2021  1:30 PM  Sign when Signing Visit  Assessment/Plan:    Problem List Items Addressed This Visit        Endocrine    Malignant neoplasm of both ovaries [facility]) - Primary       Patient is very pleasant 45-year-old female with a history of borderline ovarian cancer stage I C3 status post laparoscopic resection of both ovaries and tubes  She has no evidence of recurrence of disease now over 2 years out from surgery  Her  remains normal her exam is unremarkable  She will follow up in 6 months for repeat evaluation  She has recently completed her mammogram      Patient continues to gain weight status post hysterectomy  She is interested in a nutrition consult  This will be ordered           Relevant Orders        Ambulatory referral to Nutrition Services for Oncology      Other Visit Diagnoses     Weight gain        Relevant Orders    Ambulatory referral to Nutrition Services for Oncology            CHIEF COMPLAINT:   Surveillance borderline ovarian cancer      Problem:  Cancer Staging  Malignant neoplasm of both ovaries ([facility] [address] )  Staging form: Ovary, Fallopian Tube, Primary Peritoneal, AJCC 8th Edition  - Clinical stage from 12/10/2018: FIGO Stage I (cT1, cN0, cM0) - Signed by Eddie Singh MD on 1/8/2019  - Pathologic stage from 12/10/2018: FIGO Stage IC3, calculated as Stage Unknown (pT1c3, pNX, cM0) - Signed by Eddie Singh MD on 1/8/2019        Previous therapy:  Oncology History Malignant neoplasm of both ovaries ([facility] [address] )   12/10/2018 Initial Diagnosis    Malignant neoplasm of both ovaries ([facility] [address] )     12/10/2018 Surgery    Patient underwent laparoscopic bilateral salpingo-oophorectomy by Dr Colon  The patient was noted to have intraoperatively bilateral excrescences  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  Final pathology report indicates a stage I C3 final pathology report  There were malignant cells in the washings  A full staging was not performed as this was a borderline tumor  No evidence of further intra-abdominal tumor was seen laparoscopically   was elevated to 67 preoperatively  Current plan is to perform CT scan of the chest abdomen and pelvis and   If negative we will continue to observe  Patient ID: Kyler Gaona is a 53 y o  female   Patient is very pleasant 27-year-old female with history of bilateral salpingo-oophorectomy for stage I borderline tumor of the ovary  This was performed by [facility]  She has been following up with me since that time  Her  has normalized and the patient has been having some difficulty due to menopausal status  She has last seen   Six months ago she has had no new complaint  Today, the patient is doing well  She denies significant abdominal pain, pelvic pain, nausea, vomiting, constipation, diarrhea, fevers, chills, or vaginal bleeding  She does still note hot flashes and night sweats which are bothersome to her  Her most recent  is stable at 9 9  Her most recent mammogram in February of 2020 was unremarkable  The patient did have a appointment for nutrition but did get COVID over the wintertime  She is recovering from that and would like to reinitiate this appointment        The following portions of the patient's history were reviewed and updated as appropriate: allergies, current medications, past family history, past medical history, past social history and past surgical history  Review of Systems   Constitutional: Negative  HENT: Negative  Eyes: Negative  Respiratory: Negative  Cardiovascular: Negative  Gastrointestinal: Negative  Endocrine: Negative  Hot flashes   Genitourinary: Negative  Musculoskeletal: Negative  Skin: Negative  Neurological: Negative  Hematological: Negative  Psychiatric/Behavioral: Negative  Current Outpatient Medications   Medication Sig Dispense Refill    Calcium Carbonate-Vit D-Min (CALCIUM 1200 PO) Take by mouth      Cholecalciferol (VITAMIN D3) 2000 units capsule Take 1 capsule by mouth      FLUoxetine (PROzac) 20 mg capsule Take 20 mg by mouth every morning      hydrOXYzine HCL (ATARAX) 25 mg tablet Take 25 mg by mouth daily at bedtime      Magnesium 250 MG TABS Take 1 tablet by mouth daily      vitamin E, tocopherol, 400 units capsule Take 400 Units by mouth 2 (two) times a day       No current facility-administered medications for this visit  Objective:    Blood pressure 118/76, pulse 88, temperature 97 6 °F (36 4 °C), temperature source Tympanic, height 5' 5" (1 651 m), weight 92 1 kg (203 lb), not currently breastfeeding  Body mass index is 33 78 kg/m²  Body surface area is 1 99 meters squared      Physical Exam    Lab Results   Component Value Date     9 9 05/17/2021        Trend:  Lab Results   Component Value Date     9 9 05/17/2021     9 3 11/20/2020     10 5 07/20/2020     11 7 02/17/2020     10 7 11/11/2019     10 0 07/29/2019     13 0 04/17/2019     18 6 01/09/2019     67 2 (H) 11/29/2018

## 2021-08-18 ENCOUNTER — OFFICE VISIT (OUTPATIENT)
Dept: FAMILY MEDICINE CLINIC | Facility: CLINIC | Age: 51
End: 2021-08-18

## 2021-08-18 VITALS
RESPIRATION RATE: 18 BRPM | DIASTOLIC BLOOD PRESSURE: 78 MMHG | BODY MASS INDEX: 32.94 KG/M2 | WEIGHT: 197.7 LBS | OXYGEN SATURATION: 97 % | HEIGHT: 65 IN | HEART RATE: 63 BPM | TEMPERATURE: 97 F | SYSTOLIC BLOOD PRESSURE: 116 MMHG

## 2021-08-18 DIAGNOSIS — Z11.4 ENCOUNTER FOR SCREENING FOR HIV: ICD-10-CM

## 2021-08-18 DIAGNOSIS — N39.3 STRESS INCONTINENCE (FEMALE) (MALE): Primary | ICD-10-CM

## 2021-08-18 DIAGNOSIS — Z12.31 ENCOUNTER FOR SCREENING MAMMOGRAM FOR MALIGNANT NEOPLASM OF BREAST: ICD-10-CM

## 2021-08-18 DIAGNOSIS — Z00.00 ANNUAL PHYSICAL EXAM: ICD-10-CM

## 2021-08-18 DIAGNOSIS — Z12.31 ENCOUNTER FOR SCREENING MAMMOGRAM FOR BREAST CANCER: ICD-10-CM

## 2021-08-18 DIAGNOSIS — E55.9 VITAMIN D DEFICIENCY: ICD-10-CM

## 2021-08-18 DIAGNOSIS — Z11.59 ENCOUNTER FOR HEPATITIS C SCREENING TEST FOR LOW RISK PATIENT: ICD-10-CM

## 2021-08-18 DIAGNOSIS — Z11.4 SCREENING FOR HIV (HUMAN IMMUNODEFICIENCY VIRUS): ICD-10-CM

## 2021-08-18 DIAGNOSIS — E66.09 CLASS 1 OBESITY DUE TO EXCESS CALORIES WITHOUT SERIOUS COMORBIDITY WITH BODY MASS INDEX (BMI) OF 30.0 TO 30.9 IN ADULT: ICD-10-CM

## 2021-08-18 DIAGNOSIS — E66.9 OBESITY (BMI 30.0-34.9): ICD-10-CM

## 2021-08-18 PROCEDURE — 99396 PREV VISIT EST AGE 40-64: CPT | Performed by: FAMILY MEDICINE

## 2021-08-18 NOTE — PROGRESS NOTES
[address] [facility]    NAME: Hema Langley  AGE: 53 y o  SEX: female  : 1970     DATE: 2021     Assessment and Plan:     Problem List Items Addressed This Visit        Other    Vitamin D deficiency    Relevant Orders    Vitamin D 25 hydroxy    Class 1 obesity due to excess calories without serious comorbidity with body mass index (BMI) of 30 0 to 30 9 in adult     BMI Counseling: Body mass index is 32 9 kg/m²  The BMI is above normal  Nutrition recommendations include reducing portion sizes, decreasing overall calorie intake, reducing fast food intake, decreasing soda and/or juice intake, moderation in carbohydrate intake, increasing intake of lean protein and reducing intake of saturated fat and trans fat  Exercise recommendations include moderate aerobic physical activity for 150 minutes/week  Stress incontinence (female) (male) - Primary     Encouraged kegel exercises  Void every 3 hours while awake  Referred to urology          Relevant Orders    Ambulatory referral to Urology      Other Visit Diagnoses     Obesity (BMI 30 0-34  9)        Relevant Orders    Mammo screening bilateral w 3d & cad    Microalbumin / creatinine urine ratio    Comprehensive metabolic panel    TSH, 3rd generation with Free T4 reflex    Lipid Panel with Direct LDL reflex    Encounter for screening for HIV        Relevant Orders    HIV 1/2 Antigen/Antibody (4th Generation) w Reflex [facility]    Encounter for hepatitis C screening test for low risk patient        Relevant Orders    Hepatitis C antibody    Encounter for screening mammogram for malignant neoplasm of breast        Relevant Orders    Mammo screening bilateral w 3d & cad    Annual physical exam        Relevant Orders    Mammo screening bilateral w 3d & cad    Encounter for screening mammogram for breast cancer        Relevant Orders    Mammo screening bilateral w 3d & cad Screening for HIV (human immunodeficiency virus)        Relevant Orders    HIV 1/2 Antigen/Antibody (4th Generation) w Reflex [facility]          Immunizations and preventive care screenings were discussed with patient today  Appropriate education was printed on patient's after visit summary  Counseling:  Sexual health: discussed sexually transmitted diseases, partner selection, use of condoms, avoidance of unintended pregnancy, and contraceptive alternatives  · Exercise: the importance of regular exercise/physical activity was discussed  Recommend exercise 3-5 times per week for at least 30 minutes  Return in 1 year (on 8/18/2022)  Chief Complaint:     Chief Complaint   Patient presents with    Physical Exam     52 y/o     Urinary Incontinence     pt would like a referral for urology       History of Present Illness:     Adult Annual Physical   Patient here for a comprehensive physical exam  The patient reports problems - stress incontinence  Diet and Physical Activity  · Diet/Nutrition: well balanced diet  · Exercise: walking  Depression Screening  PHQ-9 Depression Screening    PHQ-9:   Frequency of the following problems over the past two weeks:           General Health  · Sleep: sleeps well  · Hearing: normal - bilateral   · Vision: most recent eye exam <1 year ago  · Dental: no dental visits for >1 year, brushes teeth twice daily and flosses teeth occasionally  /GYN Health  · Patient is: postmenopausal  · Last menstrual period:   · Contraceptive method: oophorectomy  Review of Systems:     Review of Systems   Genitourinary:        Urinary incontinence when laughing coughing or lifting   All other systems reviewed and are negative       Past Medical History:     Past Medical History:   Diagnosis Date    Obesity     Ovarian cancer [facility])       Past Surgical History:     Past Surgical History:   Procedure Laterality Date    BREAST BIOPSY Right     2010    HYSTERECTOMY 12/10/2018    AK LAP,DIAGNOSTIC ABDOMEN N/A 12/10/2018    Procedure: LAPAROSCOPY DIAGNOSTIC, bilateral salpingo-oophorectomy,right ovarian cystectomy,pelvic washings;  Surgeon: Karel Grace MD;  Location: [facility];  Service: Gynecology    SALPINGOOPHORECTOMY      TUBAL LIGATION        Social History:     Social History     Socioeconomic History    Marital status: /Civil Union     Spouse name: None    Number of children: None    Years of education: None    Highest education level: None   Occupational History    None   Tobacco Use    Smoking status: Former Smoker    Smokeless tobacco: Never Used   Vaping Use    Vaping Use: Never assessed   Substance and Sexual Activity    Alcohol use: No    Drug use: No    Sexual activity: Yes     Partners: Male     Birth control/protection: Female Sterilization   Other Topics Concern    None   Social History Narrative    None     Social Determinants of Health     Financial Resource Strain:     Difficulty of Paying Living Expenses:    Food Insecurity:     Worried About Running Out of Food in the Last Year:     Ran Out of Food in the Last Year:    Transportation Needs:     Lack of Transportation (Medical):      Lack of Transportation (Non-Medical):    Physical Activity:     Days of Exercise per Week:     Minutes of Exercise per Session:    Stress:     Feeling of Stress :    Social Connections:     Frequency of Communication with Friends and Family:     Frequency of Social Gatherings with Friends and Family:     Attends Taoist Services:     Active Member of Clubs or Organizations:     Attends Club or Organization Meetings:     Marital Status:    Intimate Partner Violence:     Fear of Current or Ex-Partner:     Emotionally Abused:     Physically Abused:     Sexually Abused:       Family History:     Family History   Problem Relation Age of Onset    No Known Problems Mother     No Known Problems Father     No Known Problems Sister     No Known Problems Daughter     No Known Problems Daughter       Current Medications:     Current Outpatient Medications   Medication Sig Dispense Refill    Calcium Carbonate-Vit D-Min (CALCIUM 1200 PO) Take by mouth      Cholecalciferol (VITAMIN D3) 2000 units capsule Take 1 capsule by mouth      FLUoxetine (PROzac) 20 mg capsule Take 20 mg by mouth every morning      hydrOXYzine HCL (ATARAX) 25 mg tablet Take 25 mg by mouth daily at bedtime      Magnesium 250 MG TABS Take 1 tablet by mouth daily      vitamin E, tocopherol, 400 units capsule Take 400 Units by mouth 2 (two) times a day       No current facility-administered medications for this visit  Allergies: Allergies   Allergen Reactions    Sulfa Antibiotics Shortness Of Breath     Throat closes    Nyquil Multi-Symptom [Pseudoeph-Doxylamine-Dm-Apap] Throat Swelling      Physical Exam:     /78 (BP Location: Left arm, Patient Position: Sitting, Cuff Size: Adult)   Pulse 63   Temp (!) 97 °F (36 1 °C) (Temporal)   Resp 18   Ht 5' 5" (1 651 m)   Wt 89 7 kg (197 lb 11 2 oz)   SpO2 97%   BMI 32 90 kg/m²     Physical Exam  Vitals and nursing note reviewed  Constitutional:       General: She is not in acute distress  Appearance: She is well-developed  HENT:      Head: Normocephalic and atraumatic  Eyes:      Conjunctiva/sclera: Conjunctivae normal    Cardiovascular:      Rate and Rhythm: Normal rate and regular rhythm  Heart sounds: No murmur heard  Pulmonary:      Effort: Pulmonary effort is normal  No respiratory distress  Breath sounds: Normal breath sounds  Abdominal:      Palpations: Abdomen is soft  Tenderness: There is no abdominal tenderness  Musculoskeletal:      Cervical back: Neck supple  Skin:     General: Skin is warm and dry  Neurological:      Mental Status: She is alert            MD Vishnu Yutatyana

## 2021-08-18 NOTE — ASSESSMENT & PLAN NOTE
BMI Counseling: Body mass index is 32 9 kg/m²  The BMI is above normal  Nutrition recommendations include reducing portion sizes, decreasing overall calorie intake, reducing fast food intake, decreasing soda and/or juice intake, moderation in carbohydrate intake, increasing intake of lean protein and reducing intake of saturated fat and trans fat  Exercise recommendations include moderate aerobic physical activity for 150 minutes/week

## 2021-08-18 NOTE — PATIENT INSTRUCTIONS

## 2021-08-26 ENCOUNTER — APPOINTMENT (OUTPATIENT)
Dept: LAB | Facility: HOSPITAL | Age: 51
End: 2021-08-26
Payer: COMMERCIAL

## 2021-08-26 DIAGNOSIS — E55.9 VITAMIN D DEFICIENCY: ICD-10-CM

## 2021-08-26 DIAGNOSIS — Z11.4 SCREENING FOR HIV (HUMAN IMMUNODEFICIENCY VIRUS): ICD-10-CM

## 2021-08-26 DIAGNOSIS — E66.9 OBESITY (BMI 30.0-34.9): ICD-10-CM

## 2021-08-26 DIAGNOSIS — Z11.4 ENCOUNTER FOR SCREENING FOR HIV: ICD-10-CM

## 2021-08-26 DIAGNOSIS — Z11.59 ENCOUNTER FOR HEPATITIS C SCREENING TEST FOR LOW RISK PATIENT: ICD-10-CM

## 2021-08-26 LAB
25(OH)D3 SERPL-MCNC: 41.8 NG/ML (ref 30–100)
ALBUMIN SERPL BCP-MCNC: 4.4 G/DL (ref 3.5–5)
ALP SERPL-CCNC: 108 U/L (ref 46–116)
ALT SERPL W P-5'-P-CCNC: 37 U/L (ref 12–78)
ANION GAP SERPL CALCULATED.3IONS-SCNC: 6 MMOL/L (ref 4–13)
AST SERPL W P-5'-P-CCNC: 26 U/L (ref 5–45)
BILIRUB SERPL-MCNC: 0.62 MG/DL (ref 0.2–1)
BUN SERPL-MCNC: 20 MG/DL (ref 5–25)
CALCIUM SERPL-MCNC: 9.6 MG/DL (ref 8.3–10.1)
CHLORIDE SERPL-SCNC: 103 MMOL/L (ref 100–108)
CHOLEST SERPL-MCNC: 195 MG/DL (ref 50–200)
CO2 SERPL-SCNC: 29 MMOL/L (ref 21–32)
CREAT SERPL-MCNC: 0.71 MG/DL (ref 0.6–1.3)
CREAT UR-MCNC: 59 MG/DL
GFR SERPL CREATININE-BSD FRML MDRD: 100 ML/MIN/1.73SQ M
GLUCOSE SERPL-MCNC: 90 MG/DL (ref 65–140)
HDLC SERPL-MCNC: 57 MG/DL
LDLC SERPL CALC-MCNC: 96 MG/DL (ref 0–100)
MICROALBUMIN UR-MCNC: 17.5 MG/L (ref 0–20)
MICROALBUMIN/CREAT 24H UR: 30 MG/G CREATININE (ref 0–30)
POTASSIUM SERPL-SCNC: 4 MMOL/L (ref 3.5–5.3)
PROT SERPL-MCNC: 7.7 G/DL (ref 6.4–8.2)
SODIUM SERPL-SCNC: 138 MMOL/L (ref 136–145)
TRIGL SERPL-MCNC: 208 MG/DL
TSH SERPL DL<=0.05 MIU/L-ACNC: 2.74 UIU/ML (ref 0.36–3.74)

## 2021-08-26 PROCEDURE — 82043 UR ALBUMIN QUANTITATIVE: CPT

## 2021-08-26 PROCEDURE — 87389 HIV-1 AG W/HIV-1&-2 AB AG IA: CPT

## 2021-08-26 PROCEDURE — 80061 LIPID PANEL: CPT

## 2021-08-26 PROCEDURE — 86803 HEPATITIS C AB TEST: CPT

## 2021-08-26 PROCEDURE — 82570 ASSAY OF URINE CREATININE: CPT

## 2021-08-26 PROCEDURE — 82306 VITAMIN D 25 HYDROXY: CPT

## 2021-08-26 PROCEDURE — 84443 ASSAY THYROID STIM HORMONE: CPT

## 2021-08-26 PROCEDURE — 80053 COMPREHEN METABOLIC PANEL: CPT

## 2021-08-26 PROCEDURE — 36415 COLL VENOUS BLD VENIPUNCTURE: CPT

## 2021-08-27 LAB
HCV AB SER QL: NORMAL
HIV 1+2 AB+HIV1 P24 AG SERPL QL IA: NORMAL

## 2021-10-29 ENCOUNTER — TELEPHONE (OUTPATIENT)
Dept: FAMILY MEDICINE CLINIC | Facility: CLINIC | Age: 51
End: 2021-10-29

## 2021-10-29 NOTE — TELEPHONE ENCOUNTER
Pt left a voicemail stating the rt arm had some varicose vein, and pt states she never had that and she is worried, I called and I offered appt, pt states she will send a picture through [employer] to the PCP

## 2021-11-03 ENCOUNTER — TELEPHONE (OUTPATIENT)
Dept: SURGICAL ONCOLOGY | Facility: CLINIC | Age: 51
End: 2021-11-03

## 2021-11-03 ENCOUNTER — LAB (OUTPATIENT)
Dept: LAB | Facility: CLINIC | Age: 51
End: 2021-11-03
Payer: COMMERCIAL

## 2021-11-03 ENCOUNTER — OFFICE VISIT (OUTPATIENT)
Dept: FAMILY MEDICINE CLINIC | Facility: CLINIC | Age: 51
End: 2021-11-03

## 2021-11-03 ENCOUNTER — HOSPITAL ENCOUNTER (OUTPATIENT)
Dept: NON INVASIVE DIAGNOSTICS | Facility: CLINIC | Age: 51
Discharge: HOME | End: 2021-11-03
Payer: COMMERCIAL

## 2021-11-03 VITALS
TEMPERATURE: 98 F | WEIGHT: 190 LBS | OXYGEN SATURATION: 99 % | HEIGHT: 65 IN | RESPIRATION RATE: 16 BRPM | DIASTOLIC BLOOD PRESSURE: 98 MMHG | HEART RATE: 66 BPM | SYSTOLIC BLOOD PRESSURE: 120 MMHG | BODY MASS INDEX: 31.65 KG/M2

## 2021-11-03 DIAGNOSIS — R20.0 RIGHT ARM NUMBNESS: ICD-10-CM

## 2021-11-03 DIAGNOSIS — C56.3 MALIGNANT NEOPLASM OF BOTH OVARIES (HCC): ICD-10-CM

## 2021-11-03 DIAGNOSIS — R20.0 RIGHT ARM NUMBNESS: Primary | ICD-10-CM

## 2021-11-03 LAB
ALBUMIN SERPL BCP-MCNC: 4 G/DL (ref 3.5–5)
ALP SERPL-CCNC: 101 U/L (ref 46–116)
ALT SERPL W P-5'-P-CCNC: 50 U/L (ref 12–78)
ANION GAP SERPL CALCULATED.3IONS-SCNC: 4 MMOL/L (ref 4–13)
AST SERPL W P-5'-P-CCNC: 24 U/L (ref 5–45)
BASOPHILS # BLD AUTO: 0.03 THOUSANDS/ΜL (ref 0–0.1)
BASOPHILS NFR BLD AUTO: 1 % (ref 0–1)
BILIRUB SERPL-MCNC: 0.81 MG/DL (ref 0.2–1)
BUN SERPL-MCNC: 17 MG/DL (ref 5–25)
CALCIUM SERPL-MCNC: 9.6 MG/DL (ref 8.3–10.1)
CANCER AG125 SERPL-ACNC: 11.3 U/ML (ref 0–30)
CHLORIDE SERPL-SCNC: 106 MMOL/L (ref 100–108)
CO2 SERPL-SCNC: 28 MMOL/L (ref 21–32)
CREAT SERPL-MCNC: 0.69 MG/DL (ref 0.6–1.3)
EOSINOPHIL # BLD AUTO: 0.05 THOUSAND/ΜL (ref 0–0.61)
EOSINOPHIL NFR BLD AUTO: 1 % (ref 0–6)
ERYTHROCYTE [DISTWIDTH] IN BLOOD BY AUTOMATED COUNT: 13.2 % (ref 11.6–15.1)
GFR SERPL CREATININE-BSD FRML MDRD: 103 ML/MIN/1.73SQ M
GLUCOSE P FAST SERPL-MCNC: 104 MG/DL (ref 65–99)
HCT VFR BLD AUTO: 42.9 % (ref 34.8–46.1)
HGB BLD-MCNC: 14.1 G/DL (ref 11.5–15.4)
IMM GRANULOCYTES # BLD AUTO: 0.01 THOUSAND/UL (ref 0–0.2)
IMM GRANULOCYTES NFR BLD AUTO: 0 % (ref 0–2)
LYMPHOCYTES # BLD AUTO: 1.64 THOUSANDS/ΜL (ref 0.6–4.47)
LYMPHOCYTES NFR BLD AUTO: 33 % (ref 14–44)
MCH RBC QN AUTO: 32.7 PG (ref 26.8–34.3)
MCHC RBC AUTO-ENTMCNC: 32.9 G/DL (ref 31.4–37.4)
MCV RBC AUTO: 100 FL (ref 82–98)
MONOCYTES # BLD AUTO: 0.32 THOUSAND/ΜL (ref 0.17–1.22)
MONOCYTES NFR BLD AUTO: 6 % (ref 4–12)
NEUTROPHILS # BLD AUTO: 2.99 THOUSANDS/ΜL (ref 1.85–7.62)
NEUTS SEG NFR BLD AUTO: 59 % (ref 43–75)
NRBC BLD AUTO-RTO: 0 /100 WBCS
PLATELET # BLD AUTO: 269 THOUSANDS/UL (ref 149–390)
PMV BLD AUTO: 12.3 FL (ref 8.9–12.7)
POTASSIUM SERPL-SCNC: 3.9 MMOL/L (ref 3.5–5.3)
PROT SERPL-MCNC: 7.8 G/DL (ref 6.4–8.2)
RBC # BLD AUTO: 4.31 MILLION/UL (ref 3.81–5.12)
SODIUM SERPL-SCNC: 138 MMOL/L (ref 136–145)
WBC # BLD AUTO: 5.04 THOUSAND/UL (ref 4.31–10.16)

## 2021-11-03 PROCEDURE — 85025 COMPLETE CBC W/AUTO DIFF WBC: CPT

## 2021-11-03 PROCEDURE — 93971 EXTREMITY STUDY: CPT

## 2021-11-03 PROCEDURE — 86304 IMMUNOASSAY TUMOR CA 125: CPT

## 2021-11-03 PROCEDURE — 36415 COLL VENOUS BLD VENIPUNCTURE: CPT

## 2021-11-03 PROCEDURE — 80053 COMPREHEN METABOLIC PANEL: CPT

## 2021-11-03 PROCEDURE — 99214 OFFICE O/P EST MOD 30 MIN: CPT | Performed by: PHYSICIAN ASSISTANT

## 2021-11-04 PROCEDURE — 93971 EXTREMITY STUDY: CPT | Performed by: SURGERY

## 2022-04-29 DIAGNOSIS — C56.3 MALIGNANT NEOPLASM OF BOTH OVARIES (HCC): Primary | ICD-10-CM

## 2022-05-02 ENCOUNTER — APPOINTMENT (OUTPATIENT)
Dept: LAB | Facility: HOSPITAL | Age: 52
End: 2022-05-02
Payer: COMMERCIAL

## 2022-05-02 DIAGNOSIS — C56.3 MALIGNANT NEOPLASM OF BOTH OVARIES (HCC): ICD-10-CM

## 2022-05-02 LAB — CANCER AG125 SERPL-ACNC: 9.5 U/ML (ref 0–30)

## 2022-05-02 PROCEDURE — 86304 IMMUNOASSAY TUMOR CA 125: CPT

## 2022-05-02 PROCEDURE — 36415 COLL VENOUS BLD VENIPUNCTURE: CPT

## 2022-05-05 ENCOUNTER — OFFICE VISIT (OUTPATIENT)
Dept: GYNECOLOGIC ONCOLOGY | Facility: CLINIC | Age: 52
End: 2022-05-05
Payer: COMMERCIAL

## 2022-05-05 VITALS
RESPIRATION RATE: 18 BRPM | TEMPERATURE: 98.6 F | WEIGHT: 192 LBS | HEART RATE: 66 BPM | BODY MASS INDEX: 31.99 KG/M2 | SYSTOLIC BLOOD PRESSURE: 112 MMHG | HEIGHT: 65 IN | DIASTOLIC BLOOD PRESSURE: 68 MMHG | OXYGEN SATURATION: 98 %

## 2022-05-05 DIAGNOSIS — C56.3 MALIGNANT NEOPLASM OF BOTH OVARIES (HCC): ICD-10-CM

## 2022-05-05 DIAGNOSIS — C56.9 MALIGNANT NEOPLASM OF OVARY, UNSPECIFIED LATERALITY (HCC): Primary | ICD-10-CM

## 2022-05-05 DIAGNOSIS — N39.3 STRESS INCONTINENCE OF URINE: ICD-10-CM

## 2022-05-05 PROCEDURE — 3008F BODY MASS INDEX DOCD: CPT | Performed by: OBSTETRICS & GYNECOLOGY

## 2022-05-05 PROCEDURE — 99214 OFFICE O/P EST MOD 30 MIN: CPT | Performed by: OBSTETRICS & GYNECOLOGY

## 2022-05-05 PROCEDURE — 1036F TOBACCO NON-USER: CPT | Performed by: OBSTETRICS & GYNECOLOGY

## 2022-05-05 NOTE — ASSESSMENT & PLAN NOTE
Patient is very pleasant 67-year-old female history of stage 1 C3 borderline ovarian cancer status post surgical resection in 2018 now without evidence of recurrence of disease  Her  is unremarkable her exam is unremarkable  Patient will follow-up in 6 months for repeat evaluation with  at that time

## 2022-05-05 NOTE — PROGRESS NOTES
Assessment/Plan:    Problem List Items Addressed This Visit        Endocrine    Malignant neoplasm of both ovaries [facility])     Patient is very pleasant 71-year-old female history of stage 1 C3 borderline ovarian cancer status post surgical resection in 2018 now without evidence of recurrence of disease  Her  is unremarkable her exam is unremarkable  Patient will follow-up in 6 months for repeat evaluation with  at that time  Other Visit Diagnoses     Malignant neoplasm of ovary, unspecified laterality ([facility] [address] )    -  Primary    Relevant Orders                CHIEF COMPLAINT:  Surveillance ovarian cancer stage I C3      Problem:  Cancer Staging  Malignant neoplasm of both ovaries ([facility] [address] )  Staging form: Ovary, Fallopian Tube, Primary Peritoneal, AJCC 8th Edition  - Clinical stage from 12/10/2018: FIGO Stage I (cT1, cN0, cM0) - Signed by Sherice Christina MD on 1/8/2019  - Pathologic stage from 12/10/2018: FIGO Stage IC3, calculated as Stage Unknown (pT1c3, pNX, cM0) - Signed by Sherice Christina MD on 1/8/2019        Previous therapy:  Oncology History   Malignant neoplasm of both ovaries ([facility] [address] )   12/10/2018 Initial Diagnosis    Malignant neoplasm of both ovaries ([facility] [address] )     12/10/2018 Surgery    Patient underwent laparoscopic bilateral salpingo-oophorectomy by Dr Colon  The patient was noted to have intraoperatively bilateral excrescences  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  Final pathology report indicates a stage I C3 final pathology report  There were malignant cells in the washings  A full staging was not performed as this was a borderline tumor  No evidence of further intra-abdominal tumor was seen laparoscopically   was elevated to 67 preoperatively  Current plan is to perform CT scan of the chest abdomen and pelvis and   If negative we will continue to observe             Patient ID: Loree Buck is a 49 y o  female  Patient is very pleasant 20-year-old female with a history of stage I C3 borderline ovarian cancer she underwent laparoscopic bilateral salpingo-oophorectomy in 2018  Since that time she has been without evidence of recurrence of disease  The patient was last seen 6 months ago in in the interim has had no complaint  She has recently had a  drawn which is stable at 9 5  Today, the patient is doing well  She denies significant abdominal pain, pelvic pain, nausea, vomiting, constipation, diarrhea, fevers, chills, or vaginal bleeding  She does note worsening of her stress urinary incontinence especially while laughing  She is interested in and evaluation for this  The following portions of the patient's history were reviewed and updated as appropriate: allergies, current medications, past family history, past social history, past surgical history and problem list     Review of Systems   Constitutional: Negative  HENT: Negative  Eyes: Negative  Respiratory: Negative  Cardiovascular: Negative  Gastrointestinal: Negative  Endocrine: Negative  Genitourinary: Negative  Stress urinary incontinence   Musculoskeletal: Negative  Skin: Negative  Neurological: Negative  Hematological: Negative  Psychiatric/Behavioral: Negative  Current Outpatient Medications   Medication Sig Dispense Refill    Magnesium 250 MG TABS Take 1 tablet by mouth daily      vitamin E, tocopherol, 400 units capsule Take 400 Units by mouth 2 (two) times a day      Calcium Carbonate-Vit D-Min (CALCIUM 1200 PO) Take by mouth      Cholecalciferol (VITAMIN D3) 2000 units capsule Take 1 capsule by mouth      FLUoxetine (PROzac) 20 mg capsule Take 20 mg by mouth every morning (Patient not taking: Reported on 5/5/2022 )      hydrOXYzine HCL (ATARAX) 25 mg tablet Take 25 mg by mouth daily at bedtime (Patient not taking: Reported on 5/5/2022 )       No current facility-administered medications for this visit  Objective:    Blood pressure 112/68, pulse 66, temperature 98 6 °F (37 °C), temperature source Temporal, resp  rate 18, height 5' 5" (1 651 m), weight 87 1 kg (192 lb), SpO2 98 %, not currently breastfeeding  Body mass index is 31 95 kg/m²  Body surface area is 1 94 meters squared  Physical Exam  Constitutional:       Appearance: She is well-developed  HENT:      Head: Normocephalic and atraumatic  Eyes:      Pupils: Pupils are equal, round, and reactive to light  Cardiovascular:      Rate and Rhythm: Normal rate and regular rhythm  Heart sounds: Normal heart sounds  Pulmonary:      Effort: Pulmonary effort is normal  No respiratory distress  Breath sounds: Normal breath sounds  Chest:   Breasts:      Right: No supraclavicular adenopathy  Left: No supraclavicular adenopathy  Abdominal:      General: Bowel sounds are normal  There is no distension  Palpations: Abdomen is soft  Abdomen is not rigid  Tenderness: There is no abdominal tenderness  There is no guarding or rebound  Genitourinary:     Comments: -Normal external female genitalia, normal Bartholin's and [place]'s glands                  -Normal midline urethral meatus  No lesions notes                  -Bladder without fullness mass or tenderness                  -Vagina without lesion or discharge No significant cystocele or rectocele noted                  -Cervix normal appearing without visible lesions                  -Uterus with normal contour, mobility  No tenderness,                  -Adnexae surgically absent                  - Anus without fissure of lesion    Musculoskeletal:         General: Normal range of motion  Cervical back: Normal range of motion and neck supple  Lymphadenopathy:      Cervical: No cervical adenopathy  Upper Body:      Right upper body: No supraclavicular adenopathy  Left upper body: No supraclavicular adenopathy     Skin:     General: Skin is warm and dry    Neurological:      Mental Status: She is alert and oriented to person, place, and time     Psychiatric:         Behavior: Behavior normal          Lab Results   Component Value Date     9 5 05/02/2022     Lab Results   Component Value Date    K 3 9 11/03/2021     11/03/2021    CO2 28 11/03/2021    BUN 17 11/03/2021    CREATININE 0 69 11/03/2021    GLUF 104 (H) 11/03/2021    CALCIUM 9 6 11/03/2021    AST 24 11/03/2021    ALT 50 11/03/2021    ALKPHOS 101 11/03/2021    EGFR 103 11/03/2021     Lab Results   Component Value Date    WBC 5 04 11/03/2021    HGB 14 1 11/03/2021    HCT 42 9 11/03/2021     (H) 11/03/2021     11/03/2021     Lab Results   Component Value Date    NEUTROABS 2 99 11/03/2021        Trend:  Lab Results   Component Value Date     9 5 05/02/2022     11 3 11/03/2021     9 9 05/17/2021     9 3 11/20/2020     10 5 07/20/2020     11 7 02/17/2020     10 7 11/11/2019     10 0 07/29/2019     13 0 04/17/2019     18 6 01/09/2019     67 2 (H) 11/29/2018

## 2022-05-05 NOTE — LETTER
May 5, 2022     Maryjo Florian MD  [address]  [address]    Patient: Isaura James   YOB: 1970   Date of Visit: 5/5/2022       Dear Dr Cori Corbett: Thank you for referring Johannah Kocher to me for evaluation  Below are my notes for this consultation  If you have questions, please do not hesitate to call me  I look forward to following your patient along with you  Sincerely,        Jaswinder Ghotra MD        CC: MD Jaswinder Lewis MD  5/5/2022  2:32 PM  Incomplete  Assessment/Plan:    Problem List Items Addressed This Visit        Endocrine    Malignant neoplasm of both ovaries [facility])     Patient is very pleasant 60-year-old female history of stage 1 C3 borderline ovarian cancer status post surgical resection in 2018 now without evidence of recurrence of disease  Her  is unremarkable her exam is unremarkable  Patient will follow-up in 6 months for repeat evaluation with  at that time  Other Visit Diagnoses     Malignant neoplasm of ovary, unspecified laterality ([facility] [address] )    -  Primary    Relevant Orders                CHIEF COMPLAINT:  Surveillance ovarian cancer stage I C3      Problem:  Cancer Staging  Malignant neoplasm of both ovaries ([facility] [address] )  Staging form: Ovary, Fallopian Tube, Primary Peritoneal, AJCC 8th Edition  - Clinical stage from 12/10/2018: FIGO Stage I (cT1, cN0, cM0) - Signed by Jaswinder Ghotra MD on 1/8/2019  - Pathologic stage from 12/10/2018: FIGO Stage IC3, calculated as Stage Unknown (pT1c3, pNX, cM0) - Signed by Jaswinder Ghotra MD on 1/8/2019        Previous therapy:  Oncology History   Malignant neoplasm of both ovaries ([facility] [address] )   12/10/2018 Initial Diagnosis    Malignant neoplasm of both ovaries ([address] )     12/10/2018 Surgery    Patient underwent laparoscopic bilateral salpingo-oophorectomy by Dr Colon  The patient was noted to have intraoperatively bilateral excrescences    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  Final pathology report indicates a stage I C3 final pathology report  There were malignant cells in the washings  A full staging was not performed as this was a borderline tumor  No evidence of further intra-abdominal tumor was seen laparoscopically   was elevated to 67 preoperatively  Current plan is to perform CT scan of the chest abdomen and pelvis and   If negative we will continue to observe  Patient ID: Bishnu Terry is a 49 y o  female  Patient is very pleasant 81-year-old female with a history of stage I C3 borderline ovarian cancer she underwent laparoscopic bilateral salpingo-oophorectomy in 2018  Since that time she has been without evidence of recurrence of disease  The patient was last seen 6 months ago in in the interim has had no complaint  She has recently had a  drawn which is stable at 9 5  Today, the patient is doing well  She denies significant abdominal pain, pelvic pain, nausea, vomiting, constipation, diarrhea, fevers, chills, or vaginal bleeding  The following portions of the patient's history were reviewed and updated as appropriate: allergies, current medications, past family history, past social history, past surgical history and problem list     Review of Systems   Constitutional: Negative  HENT: Negative  Eyes: Negative  Respiratory: Negative  Cardiovascular: Negative  Gastrointestinal: Negative  Endocrine: Negative  Genitourinary: Negative  Musculoskeletal: Negative  Skin: Negative  Neurological: Negative  Hematological: Negative  Psychiatric/Behavioral: Negative          Current Outpatient Medications   Medication Sig Dispense Refill    Magnesium 250 MG TABS Take 1 tablet by mouth daily      vitamin E, tocopherol, 400 units capsule Take 400 Units by mouth 2 (two) times a day      Calcium Carbonate-Vit D-Min (CALCIUM 1200 PO) Take by mouth      Cholecalciferol (VITAMIN D3) 2000 units capsule Take 1 capsule by mouth      FLUoxetine (PROzac) 20 mg capsule Take 20 mg by mouth every morning (Patient not taking: Reported on 5/5/2022 )      hydrOXYzine HCL (ATARAX) 25 mg tablet Take 25 mg by mouth daily at bedtime (Patient not taking: Reported on 5/5/2022 )       No current facility-administered medications for this visit  Objective:    Blood pressure 112/68, pulse 66, temperature 98 6 °F (37 °C), temperature source Temporal, resp  rate 18, height 5' 5" (1 651 m), weight 87 1 kg (192 lb), SpO2 98 %, not currently breastfeeding  Body mass index is 31 95 kg/m²  Body surface area is 1 94 meters squared  Physical Exam  Constitutional:       Appearance: She is well-developed  HENT:      Head: Normocephalic and atraumatic  Eyes:      Pupils: Pupils are equal, round, and reactive to light  Cardiovascular:      Rate and Rhythm: Normal rate and regular rhythm  Heart sounds: Normal heart sounds  Pulmonary:      Effort: Pulmonary effort is normal  No respiratory distress  Breath sounds: Normal breath sounds  Chest:   Breasts:      Right: No supraclavicular adenopathy  Left: No supraclavicular adenopathy  Abdominal:      General: Bowel sounds are normal  There is no distension  Palpations: Abdomen is soft  Abdomen is not rigid  Tenderness: There is no abdominal tenderness  There is no guarding or rebound  Genitourinary:     Comments: -Normal external female genitalia, normal Bartholin's and [place]'s glands                  -Normal midline urethral meatus  No lesions notes                  -Bladder without fullness mass or tenderness                  -Vagina without lesion or discharge No significant cystocele or rectocele noted                  -Cervix normal appearing without visible lesions                  -Uterus with normal contour, mobility   No tenderness,                  -Adnexae surgically absent                  - Anus without fissure of lesion    Musculoskeletal:         General: Normal range of motion  Cervical back: Normal range of motion and neck supple  Lymphadenopathy:      Cervical: No cervical adenopathy  Upper Body:      Right upper body: No supraclavicular adenopathy  Left upper body: No supraclavicular adenopathy  Skin:     General: Skin is warm and dry  Neurological:      Mental Status: She is alert and oriented to person, place, and time     Psychiatric:         Behavior: Behavior normal          Lab Results   Component Value Date     9 5 05/02/2022     Lab Results   Component Value Date    K 3 9 11/03/2021     11/03/2021    CO2 28 11/03/2021    BUN 17 11/03/2021    CREATININE 0 69 11/03/2021    GLUF 104 (H) 11/03/2021    CALCIUM 9 6 11/03/2021    AST 24 11/03/2021    ALT 50 11/03/2021    ALKPHOS 101 11/03/2021    EGFR 103 11/03/2021     Lab Results   Component Value Date    WBC 5 04 11/03/2021    HGB 14 1 11/03/2021    HCT 42 9 11/03/2021     (H) 11/03/2021     11/03/2021     Lab Results   Component Value Date    NEUTROABS 2 99 11/03/2021        Trend:  Lab Results   Component Value Date     9 5 05/02/2022     11 3 11/03/2021     9 9 05/17/2021     9 3 11/20/2020     10 5 07/20/2020     11 7 02/17/2020     10 7 11/11/2019     10 0 07/29/2019     13 0 04/17/2019     18 6 01/09/2019     67 2 (H) 11/29/2018

## 2022-10-12 PROBLEM — Z20.822 FEVER WITH EXPOSURE TO COVID-19 VIRUS: Status: RESOLVED | Noted: 2021-02-03 | Resolved: 2022-10-12

## 2022-10-12 PROBLEM — R50.9 FEVER WITH EXPOSURE TO COVID-19 VIRUS: Status: RESOLVED | Noted: 2021-02-03 | Resolved: 2022-10-12

## 2022-10-27 ENCOUNTER — TELEPHONE (OUTPATIENT)
Dept: GYNECOLOGIC ONCOLOGY | Facility: CLINIC | Age: 52
End: 2022-10-27

## 2022-10-27 NOTE — TELEPHONE ENCOUNTER
Called to remind patient to get her blood work drawn prior to her appointment with Leah Huang on 11/3/2022 and patient stated she moved away and will not be at this appointment

## 2024-03-07 ENCOUNTER — OFFICE VISIT (OUTPATIENT)
Dept: FAMILY MEDICINE CLINIC | Facility: CLINIC | Age: 54
End: 2024-03-07

## 2024-03-07 VITALS
WEIGHT: 192 LBS | SYSTOLIC BLOOD PRESSURE: 118 MMHG | BODY MASS INDEX: 32.78 KG/M2 | RESPIRATION RATE: 18 BRPM | HEIGHT: 64 IN | TEMPERATURE: 99.8 F | DIASTOLIC BLOOD PRESSURE: 74 MMHG | HEART RATE: 92 BPM | OXYGEN SATURATION: 97 %

## 2024-03-07 DIAGNOSIS — C56.3 MALIGNANT NEOPLASM OF BOTH OVARIES (HCC): ICD-10-CM

## 2024-03-07 DIAGNOSIS — J06.9 UPPER RESPIRATORY TRACT INFECTION, UNSPECIFIED TYPE: Primary | ICD-10-CM

## 2024-03-07 PROCEDURE — 99213 OFFICE O/P EST LOW 20 MIN: CPT | Performed by: FAMILY MEDICINE

## 2024-03-07 RX ORDER — BENZONATATE 100 MG/1
100 CAPSULE ORAL 3 TIMES DAILY PRN
Qty: 20 CAPSULE | Refills: 0 | Status: SHIPPED | OUTPATIENT
Start: 2024-03-07

## 2024-03-07 NOTE — PROGRESS NOTES
Name: Inez Lindsay      : 1970      MRN: [id number]  Encounter Provider: Rodney Mendoza MD  Encounter Date: 3/7/2024   Encounter department: [facility]    Assessment & Plan     1. Upper respiratory tract infection, unspecified type  Assessment & Plan:  Likely URI based on symptoms    PLAN:  - At this time will do supportive care  - Advised to keep hydrated and drink plenty of fluids, try Gatorade or Pedialyte   - Declined COVID/FLU   - Tylenol or Ibuprofen q 8hrs prn for fever, sore throat or body aches   - Tessalon Perles for cough   - If no improvement in the next 3-4 days consider CXRAY     Orders:  -     benzonatate (TESSALON PERLES) 100 mg capsule; Take 1 capsule (100 mg total) by mouth 3 (three) times a day as needed for cough    2. Malignant neoplasm of both ovaries (HCC)  Assessment & Plan:  Has been away and has not gone back to gynonc   Will order  and have patient reestablish care    Orders:  -     ; Future  -     Ambulatory Referral to Gynecologic Oncology; Future         Subjective      Patient is Setswana speaking only and voice  services was utilized to conduct office visit.        Inez Lindsay is a 53 y.o. female presenting today for cough     Patient is accompanied by her .       Cough  This is a new problem. The current episode started in the past 7 days. The problem has been gradually worsening. The cough is Non-productive. Associated symptoms include chills, headaches, nasal congestion, rhinorrhea and a sore throat. Pertinent negatives include no chest pain or fever. She has tried OTC cough suppressant (acetaminophen) for the symptoms. The treatment provided moderate (Kem Flu) relief.   Headache    Review of Systems   Constitutional:  Positive for chills. Negative for fever.   HENT:  Positive for rhinorrhea and sore throat.    Respiratory:  Positive for cough.    Cardiovascular:  Negative for chest pain.  "  Neurological:  Positive for headaches.       Current Outpatient Medications on File Prior to Visit   Medication Sig   • Calcium Carbonate-Vit D-Min (CALCIUM 1200 PO) Take by mouth   • Cholecalciferol (VITAMIN D3) 2000 units capsule Take 1 capsule by mouth   • FLUoxetine (PROzac) 20 mg capsule Take 20 mg by mouth every morning (Patient not taking: Reported on 5/5/2022 )   • hydrOXYzine HCL (ATARAX) 25 mg tablet Take 25 mg by mouth daily at bedtime (Patient not taking: Reported on 5/5/2022 )   • Magnesium 250 MG TABS Take 1 tablet by mouth daily   • vitamin E, tocopherol, 400 units capsule Take 400 Units by mouth 2 (two) times a day       Objective     /74 (BP Location: Right arm, Patient Position: Sitting, Cuff Size: Standard)   Pulse 92   Temp 99.8 °F (37.7 °C) (Temporal)   Resp 18   Ht 5' 4\" (1.626 m)   Wt 87.1 kg (192 lb)   SpO2 97%   BMI 32.96 kg/m²     Physical Exam  Constitutional:       Appearance: Normal appearance.   HENT:      Head: Normocephalic and atraumatic.      Nose: Congestion present.      Mouth/Throat:      Mouth: Mucous membranes are moist.      Pharynx: No oropharyngeal exudate or posterior oropharyngeal erythema.   Eyes:      Extraocular Movements: Extraocular movements intact.      Pupils: Pupils are equal, round, and reactive to light.   Cardiovascular:      Rate and Rhythm: Normal rate and regular rhythm.      Pulses: Normal pulses.      Heart sounds: Normal heart sounds.   Pulmonary:      Effort: Pulmonary effort is normal. No respiratory distress.      Breath sounds: No stridor. Rhonchi (mild rhonchi noted in upper right lung fields) present. No wheezing or rales.   Abdominal:      General: Bowel sounds are normal. There is no distension.      Palpations: Abdomen is soft.      Tenderness: There is no abdominal tenderness.   Skin:     General: Skin is warm.      Capillary Refill: Capillary refill takes less than 2 seconds.   Neurological:      General: No focal deficit " present.      Mental Status: She is alert and oriented to person, place, and time.   Psychiatric:         Mood and Affect: Mood normal.         Behavior: Behavior normal.       Rodney Mendoza MD

## 2024-03-07 NOTE — ASSESSMENT & PLAN NOTE
Likely URI based on symptoms    PLAN:  - At this time will do supportive care  - Advised to keep hydrated and drink plenty of fluids, try Gatorade or Pedialyte   - Declined COVID/FLU   - Tylenol or Ibuprofen q 8hrs prn for fever, sore throat or body aches   - Tessalon Perles for cough   - If no improvement in the next 3-4 days consider CXRAY

## 2024-03-08 ENCOUNTER — TELEPHONE (OUTPATIENT)
Dept: HEMATOLOGY ONCOLOGY | Facility: CLINIC | Age: 54
End: 2024-03-08

## 2024-03-08 NOTE — TELEPHONE ENCOUNTER
I called Inez in response to a referral that was received for patient to establish care with Gynecologic Oncology.     Outreach was made to schedule fu with Dr Jackson as the patient was established.    I left a voicemail explaining the reason for my call and advised patient to call [facility] at [phone].  The referral has been closed.

## (undated) DEVICE — PREMIUM DRY TRAY LF: Brand: MEDLINE INDUSTRIES, INC.

## (undated) DEVICE — 3M™ STERI-STRIP™ REINFORCED ADHESIVE SKIN CLOSURES, R1547, 1/2 IN X 4 IN (12 MM X 100 MM), 6 STRIPS/ENVELOPE: Brand: 3M™ STERI-STRIP™

## (undated) DEVICE — TROCAR: Brand: KII SLEEVE

## (undated) DEVICE — ADHESIVE SKN CLSR HISTOACRYL FLEX 0.5ML LF

## (undated) DEVICE — [HIGH FLOW INSUFFLATOR,  DO NOT USE IF PACKAGE IS DAMAGED,  KEEP DRY,  KEEP AWAY FROM SUNLIGHT,  PROTECT FROM HEAT AND RADIOACTIVE SOURCES.]: Brand: PNEUMOSURE

## (undated) DEVICE — TISSUE RETRIEVAL SYSTEM: Brand: INZII RETRIEVAL SYSTEM

## (undated) DEVICE — GAUZE,SPONGE,2"X2",8PLY,STERILE,LF,2'S: Brand: MEDLINE

## (undated) DEVICE — GLOVE PI ULTRA TOUCH SZ.8.0

## (undated) DEVICE — DRAPE EQUIPMENT RF WAND

## (undated) DEVICE — IRRIG ENDO FLO TUBING

## (undated) DEVICE — ENDOPATH XCEL BLADELESS TROCARS WITH STABILITY SLEEVES: Brand: ENDOPATH XCEL

## (undated) DEVICE — GLOVE PI ULTRA TOUCH SZ.7.0

## (undated) DEVICE — ENDOPATH XCEL UNIVERSAL TROCAR STABLILITY SLEEVES: Brand: ENDOPATH XCEL

## (undated) DEVICE — GLOVE INDICATOR PI UNDERGLOVE SZ 7.5 BLUE

## (undated) DEVICE — CLOSURE DEVICE ENDO CLOSE

## (undated) DEVICE — SYRINGE 50ML LL

## (undated) DEVICE — PVC URETHRAL CATHETER: Brand: DOVER

## (undated) DEVICE — SUT MONOCRYL 4-0 PS-2 27 IN Y426H

## (undated) DEVICE — GLOVE PI ULTRA TOUCH SZ.6.5

## (undated) DEVICE — Device: Brand: TISSUE RETRIEVAL SYSTEM

## (undated) DEVICE — TROCAR: Brand: KII FIOS FIRST ENTRY

## (undated) DEVICE — GLOVE INDICATOR PI UNDERGLOVE SZ 8 BLUE

## (undated) DEVICE — INTENDED FOR TISSUE SEPARATION, AND OTHER PROCEDURES THAT REQUIRE A SHARP SURGICAL BLADE TO PUNCTURE OR CUT.: Brand: BARD-PARKER SAFETY BLADES SIZE 11, STERILE

## (undated) DEVICE — BETHLEHEM UNIVERSAL GYN LAP PK: Brand: CARDINAL HEALTH

## (undated) DEVICE — CHLORAPREP HI-LITE 26ML ORANGE

## (undated) DEVICE — GLOVE INDICATOR PI UNDERGLOVE SZ 7 BLUE

## (undated) DEVICE — TUBING SMOKE EVAC W/FILTRATION DEVICE PLUMEPORT ACTIV

## (undated) DEVICE — 3M™ TEGADERM™ TRANSPARENT FILM DRESSING FRAME STYLE, 1624W, 2-3/8 IN X 2-3/4 IN (6 CM X 7 CM), 100/CT 4CT/CASE: Brand: 3M™ TEGADERM™

## (undated) DEVICE — ENSEAL LAPAROSCOPIC TISSUE SEALER G2 STRAIGHT JAW FOR USE WITH G2 GENERATOR 5MM DIAMETER 35CM SHAFT LENGTH: Brand: ENSEAL